# Patient Record
Sex: MALE | Race: WHITE | HISPANIC OR LATINO | Employment: OTHER | URBAN - METROPOLITAN AREA
[De-identification: names, ages, dates, MRNs, and addresses within clinical notes are randomized per-mention and may not be internally consistent; named-entity substitution may affect disease eponyms.]

---

## 2017-11-28 ENCOUNTER — HOSPITAL ENCOUNTER (OUTPATIENT)
Facility: HOSPITAL | Age: 27
Setting detail: OBSERVATION
Discharge: HOME | End: 2017-11-29
Attending: EMERGENCY MEDICINE | Admitting: UROLOGY

## 2017-11-28 ENCOUNTER — APPOINTMENT (EMERGENCY)
Dept: CT IMAGING | Facility: HOSPITAL | Age: 27
End: 2017-11-28

## 2017-11-28 ENCOUNTER — ANESTHESIA EVENT (OUTPATIENT)
Dept: PERIOP | Facility: HOSPITAL | Age: 27
End: 2017-11-28

## 2017-11-28 DIAGNOSIS — N20.0 KIDNEY STONE ON LEFT SIDE: Primary | ICD-10-CM

## 2017-11-28 DIAGNOSIS — N20.0 RENAL CALCULUS, LEFT: ICD-10-CM

## 2017-11-28 LAB
ALBUMIN SERPL BCP-MCNC: 4.1 G/DL (ref 3.5–5)
ALP SERPL-CCNC: 76 U/L (ref 46–116)
ALT SERPL W P-5'-P-CCNC: 26 U/L (ref 12–78)
ANION GAP SERPL CALCULATED.3IONS-SCNC: 10 MMOL/L (ref 4–13)
AST SERPL W P-5'-P-CCNC: 21 U/L (ref 5–45)
BACTERIA UR QL AUTO: ABNORMAL /HPF
BASOPHILS # BLD AUTO: 0.02 THOUSANDS/ΜL (ref 0–0.1)
BASOPHILS NFR BLD AUTO: 0 % (ref 0–1)
BILIRUB SERPL-MCNC: 0.58 MG/DL (ref 0.2–1)
BILIRUB UR QL STRIP: NEGATIVE
BUN SERPL-MCNC: 19 MG/DL (ref 5–25)
CALCIUM SERPL-MCNC: 8.9 MG/DL (ref 8.3–10.1)
CHLORIDE SERPL-SCNC: 104 MMOL/L (ref 100–108)
CLARITY UR: CLEAR
CO2 SERPL-SCNC: 24 MMOL/L (ref 21–32)
COLOR UR: YELLOW
CREAT SERPL-MCNC: 1.39 MG/DL (ref 0.6–1.3)
EOSINOPHIL # BLD AUTO: 0.16 THOUSAND/ΜL (ref 0–0.61)
EOSINOPHIL NFR BLD AUTO: 2 % (ref 0–6)
ERYTHROCYTE [DISTWIDTH] IN BLOOD BY AUTOMATED COUNT: 12.1 % (ref 11.6–15.1)
GFR SERPL CREATININE-BSD FRML MDRD: 68 ML/MIN/1.73SQ M
GLUCOSE SERPL-MCNC: 116 MG/DL (ref 65–140)
GLUCOSE UR STRIP-MCNC: NEGATIVE MG/DL
HCT VFR BLD AUTO: 42 % (ref 36.5–49.3)
HGB BLD-MCNC: 14.7 G/DL (ref 12–17)
HGB UR QL STRIP.AUTO: ABNORMAL
KETONES UR STRIP-MCNC: NEGATIVE MG/DL
LEUKOCYTE ESTERASE UR QL STRIP: NEGATIVE
LIPASE SERPL-CCNC: 137 U/L (ref 73–393)
LYMPHOCYTES # BLD AUTO: 2.42 THOUSANDS/ΜL (ref 0.6–4.47)
LYMPHOCYTES NFR BLD AUTO: 34 % (ref 14–44)
MCH RBC QN AUTO: 32.7 PG (ref 26.8–34.3)
MCHC RBC AUTO-ENTMCNC: 35 G/DL (ref 31.4–37.4)
MCV RBC AUTO: 93 FL (ref 82–98)
MONOCYTES # BLD AUTO: 0.93 THOUSAND/ΜL (ref 0.17–1.22)
MONOCYTES NFR BLD AUTO: 13 % (ref 4–12)
NEUTROPHILS # BLD AUTO: 3.69 THOUSANDS/ΜL (ref 1.85–7.62)
NEUTS SEG NFR BLD AUTO: 51 % (ref 43–75)
NITRITE UR QL STRIP: NEGATIVE
NON-SQ EPI CELLS URNS QL MICRO: ABNORMAL /HPF
NRBC BLD AUTO-RTO: 0 /100 WBCS
PH UR STRIP.AUTO: 7 [PH] (ref 4.5–8)
PLATELET # BLD AUTO: 241 THOUSANDS/UL (ref 149–390)
PMV BLD AUTO: 9.7 FL (ref 8.9–12.7)
POTASSIUM SERPL-SCNC: 4 MMOL/L (ref 3.5–5.3)
PROT SERPL-MCNC: 7.6 G/DL (ref 6.4–8.2)
PROT UR STRIP-MCNC: NEGATIVE MG/DL
RBC # BLD AUTO: 4.5 MILLION/UL (ref 3.88–5.62)
RBC #/AREA URNS AUTO: ABNORMAL /HPF
SODIUM SERPL-SCNC: 138 MMOL/L (ref 136–145)
SP GR UR STRIP.AUTO: 1.01 (ref 1–1.03)
UROBILINOGEN UR QL STRIP.AUTO: 0.2 E.U./DL
WBC # BLD AUTO: 7.22 THOUSAND/UL (ref 4.31–10.16)
WBC #/AREA URNS AUTO: ABNORMAL /HPF

## 2017-11-28 PROCEDURE — 83690 ASSAY OF LIPASE: CPT | Performed by: PHYSICIAN ASSISTANT

## 2017-11-28 PROCEDURE — 81001 URINALYSIS AUTO W/SCOPE: CPT

## 2017-11-28 PROCEDURE — 96374 THER/PROPH/DIAG INJ IV PUSH: CPT

## 2017-11-28 PROCEDURE — 85025 COMPLETE CBC W/AUTO DIFF WBC: CPT | Performed by: PHYSICIAN ASSISTANT

## 2017-11-28 PROCEDURE — 80053 COMPREHEN METABOLIC PANEL: CPT | Performed by: PHYSICIAN ASSISTANT

## 2017-11-28 PROCEDURE — 74176 CT ABD & PELVIS W/O CONTRAST: CPT

## 2017-11-28 PROCEDURE — 81002 URINALYSIS NONAUTO W/O SCOPE: CPT | Performed by: PHYSICIAN ASSISTANT

## 2017-11-28 PROCEDURE — 96375 TX/PRO/DX INJ NEW DRUG ADDON: CPT

## 2017-11-28 PROCEDURE — 99285 EMERGENCY DEPT VISIT HI MDM: CPT

## 2017-11-28 PROCEDURE — 36415 COLL VENOUS BLD VENIPUNCTURE: CPT | Performed by: PHYSICIAN ASSISTANT

## 2017-11-28 PROCEDURE — 96361 HYDRATE IV INFUSION ADD-ON: CPT

## 2017-11-28 RX ORDER — KETOROLAC TROMETHAMINE 30 MG/ML
15 INJECTION, SOLUTION INTRAMUSCULAR; INTRAVENOUS ONCE
Status: COMPLETED | OUTPATIENT
Start: 2017-11-28 | End: 2017-11-28

## 2017-11-28 RX ORDER — ONDANSETRON 2 MG/ML
4 INJECTION INTRAMUSCULAR; INTRAVENOUS ONCE
Status: COMPLETED | OUTPATIENT
Start: 2017-11-28 | End: 2017-11-28

## 2017-11-28 RX ORDER — ONDANSETRON 2 MG/ML
4 INJECTION INTRAMUSCULAR; INTRAVENOUS EVERY 6 HOURS PRN
Status: DISCONTINUED | OUTPATIENT
Start: 2017-11-28 | End: 2017-11-29 | Stop reason: HOSPADM

## 2017-11-28 RX ORDER — TAMSULOSIN HYDROCHLORIDE 0.4 MG/1
0.4 CAPSULE ORAL ONCE
Status: DISCONTINUED | OUTPATIENT
Start: 2017-11-28 | End: 2017-11-29 | Stop reason: HOSPADM

## 2017-11-28 RX ORDER — MORPHINE SULFATE 2 MG/ML
2 INJECTION, SOLUTION INTRAMUSCULAR; INTRAVENOUS ONCE
Status: COMPLETED | OUTPATIENT
Start: 2017-11-28 | End: 2017-11-28

## 2017-11-28 RX ORDER — TAMSULOSIN HYDROCHLORIDE 0.4 MG/1
0.4 CAPSULE ORAL ONCE
Status: COMPLETED | OUTPATIENT
Start: 2017-11-28 | End: 2017-11-28

## 2017-11-28 RX ORDER — SODIUM CHLORIDE 9 MG/ML
125 INJECTION, SOLUTION INTRAVENOUS CONTINUOUS
Status: DISCONTINUED | OUTPATIENT
Start: 2017-11-28 | End: 2017-11-29 | Stop reason: HOSPADM

## 2017-11-28 RX ADMIN — SODIUM CHLORIDE 125 ML/HR: 0.9 INJECTION, SOLUTION INTRAVENOUS at 21:57

## 2017-11-28 RX ADMIN — TAMSULOSIN HYDROCHLORIDE 0.4 MG: 0.4 CAPSULE ORAL at 10:00

## 2017-11-28 RX ADMIN — KETOROLAC TROMETHAMINE 15 MG: 30 INJECTION, SOLUTION INTRAMUSCULAR at 08:44

## 2017-11-28 RX ADMIN — MORPHINE SULFATE 2 MG: 2 INJECTION, SOLUTION INTRAMUSCULAR; INTRAVENOUS at 09:38

## 2017-11-28 RX ADMIN — KETOROLAC TROMETHAMINE 15 MG: 30 INJECTION, SOLUTION INTRAMUSCULAR at 13:24

## 2017-11-28 RX ADMIN — HYDROMORPHONE HYDROCHLORIDE 0.5 MG: 1 INJECTION, SOLUTION INTRAMUSCULAR; INTRAVENOUS; SUBCUTANEOUS at 10:18

## 2017-11-28 RX ADMIN — SODIUM CHLORIDE 1000 ML: 0.9 INJECTION, SOLUTION INTRAVENOUS at 09:09

## 2017-11-28 RX ADMIN — SODIUM CHLORIDE 1000 ML: 0.9 INJECTION, SOLUTION INTRAVENOUS at 12:00

## 2017-11-28 RX ADMIN — ONDANSETRON 4 MG: 2 INJECTION INTRAMUSCULAR; INTRAVENOUS at 09:09

## 2017-11-28 RX ADMIN — SODIUM CHLORIDE 125 ML/HR: 0.9 INJECTION, SOLUTION INTRAVENOUS at 13:24

## 2017-11-28 NOTE — CONSULTS
H&P- Urology   Anthony Griffin 32 y o  male MRN: [id number]  Unit/Bed#: ED 29 Encounter: [id number] 11/28/2017           Assessment/Plan  24 hours of severe left flank and lower quadrant pain  CT shows 6 x 3 millimeter stone with mild hydro  Will give the patient hydration, pain meds, Flomax  Observe overnight, and if no better tomorrow we will proceed with cysto, left ureteroscopy, stone laser and stent placement  We will reassess first thing in the morning before deciding if it is ago  Last episode the stone was 10 years ago             History of Present Illness     Attending: Shun Curtis MD    Reason for Consult /     Medications  Meds/Allergies     sodium chloride 125 mL/hr Last Rate: 125 mL/hr (11/28/17 1324)       HYDROmorphone    ondansetron  None       Current Facility-Administered Medications:     HYDROmorphone (DILAUDID) 1 mg/mL injection 0 5 mg, 0 5 mg, Intravenous, Q2H PRN, Lulla Jews, CRNP    nicotine (NICODERM CQ) 7 mg/24hr TD 24 hr patch 1 patch, 1 patch, Transdermal, Daily, Lulla Jews, CRNP    ondansetron [facility] injection 4 mg, 4 mg, Intravenous, Q6H PRN, Lulla Jews, CRNP    sodium chloride 0 9 % infusion, 125 mL/hr, Intravenous, Continuous, Lulla Jews, CRNP, Last Rate: 125 mL/hr at 11/28/17 1324, 125 mL/hr at 11/28/17 1324    tamsulosin (FLOMAX) capsule 0 4 mg, 0 4 mg, Oral, Once, Lulla Jews, CRNP, Stopped at 11/28/17 1317  No current outpatient prescriptions on file  Review of Systems  10 point review of systems negative except as noted in HPI  Allergies  No Known Allergies  PMH  Past Medical History:   Diagnosis Date    Kidney stones      Past surgical history  History reviewed  No pertinent surgical history    Social history  Social History   Substance Use Topics    Smoking status: Current Every Day Smoker    Smokeless tobacco: Never Used    Alcohol use No     ?  Physical Exam  General appearance: alert, appears stated age and cooperative  Head: Normocephalic, without obvious abnormality, atraumatic  Neck: no adenopathy, no carotid bruit, no JVD, supple, symmetrical, trachea midline and thyroid not enlarged, symmetric, no tenderness/mass/nodules  Lungs: clear to auscultation bilaterally  Heart: regular rate and rhythm, S1, S2 normal, no murmur, click, rub or gallop  Abdomen: soft, non-tender; bowel sounds normal; no masses,  no organomegaly  Male genitalia: normal  Extremities: extremities normal, atraumatic, no cyanosis or edema  Pulses: 2+ and symmetric  Lymph nodes: Cervical, supraclavicular, and axillary nodes normal   Neurologic: Grossly normal    Consults    Review of Systems    Historical Information   No Known Allergies  Past Medical History:   Diagnosis Date    Kidney stones      History reviewed  No pertinent surgical history  Social History   History   Alcohol Use No     History   Drug Use    Types: Marijuana     History   Smoking Status    Current Every Day Smoker   Smokeless Tobacco    Never Used     Family History: non-contributory        Lab Results:   CBC:   Lab Results   Component Value Date    WBC 7 22 11/28/2017    HGB 14 7 11/28/2017    HCT 42 0 11/28/2017    MCV 93 11/28/2017     11/28/2017    MCH 32 7 11/28/2017    MCHC 35 0 11/28/2017    RDW 12 1 11/28/2017    MPV 9 7 11/28/2017    NRBC 0 11/28/2017     CMP:   Lab Results   Component Value Date     11/28/2017     11/28/2017    CO2 24 11/28/2017    ANIONGAP 10 11/28/2017    BUN 19 11/28/2017    CREATININE 1 39 (H) 11/28/2017    GLUCOSE 116 11/28/2017    CALCIUM 8 9 11/28/2017    AST 21 11/28/2017    ALT 26 11/28/2017    ALKPHOS 76 11/28/2017    PROT 7 6 11/28/2017    ALBUMIN 4 1 11/28/2017    BILITOT 0 58 11/28/2017    EGFR 68 11/28/2017       Imaging Studies: I have personally reviewed pertinent reports      Ct Renal Stone Study Abdomen Pelvis Without Contrast    Result Date: 11/28/2017  Narrative: CT ABDOMEN AND PELVIS WITHOUT IV CONTRAST - LOW DOSE RENAL STONE INDICATION: Left flank pain radiating into left groin, difficulty urinating  COMPARISON: None TECHNIQUE:  Low dose thin section CT examination of the abdomen and pelvis was performed without intravenous or oral contrast according to a protocol specifically designed to evaluate for urinary tract calculus  Reformatted images were created in axial,  sagittal, and coronal planes  Evaluation for pathology in the abdomen and pelvis that is unrelated to urinary tract calculi is limited  Radiation dose length product (DLP) for this visit:  145 mGy-cm   This examination, like all CT scans performed in the [facility], was performed utilizing techniques to minimize radiation dose exposure, including the use of iterative reconstruction and automated exposure control  FINDINGS: RIGHT KIDNEY AND URETER: No urinary tract calculi  No hydronephrosis or hydroureter  No perinephric collection  LEFT KIDNEY AND URETER: Mild left hydroureteronephrosis secondary to a 6 x 3 mm calculus in the distal left ureter at or just above the level of the UVJ  URINARY BLADDER: Bladder suboptimally distended, otherwise unremarkable apart from the aforementioned calculus near the UVJ  Bilateral pelvic phleboliths  No significant abnormality in the visualized lung bases  Please note the upper liver, spleen, right adrenal gland and stomach were not fully included on axial images  Limited low radiation dose noncontrast CT evaluation demonstrates no clinically significant abnormality of liver, spleen, pancreas, or adrenal glands  No calcified gallstones or gallbladder wall thickening noted  No ascites  Evaluation of lymphadenopathy is limited in the absence of intravenous contrast  No bulky adenopathy detected on this noncontrast study  Bowel loops appear unremarkable  Limited evaluation demonstrates no evidence to suggest acute appendicitis  No acute fracture or destructive osseous lesion is identified  Impression: Mild left hydroureteronephrosis secondary to a 6 x 3 mm calculus in the distal left ureter at or just above the level of the UVJ  Workstation performed: [id number]       EKG, Pathology, and Other Studies: I have personally reviewed pertinent reports  Counseling / Coordination of Care  Total floor / unit time spent today 35 minutes  Greater than 50% of total time was spent with the patient and / or family counseling and / or coordination of care       Neymar Claros MD

## 2017-11-28 NOTE — PLAN OF CARE
Problem: PAIN - ADULT  Goal: Verbalizes/displays adequate comfort level or baseline comfort level  Interventions:  - Encourage patient to monitor pain and request assistance  - Assess pain using appropriate pain scale  - Administer analgesics based on type and severity of pain and evaluate response  - Implement non-pharmacological measures as appropriate and evaluate response  - Consider cultural and social influences on pain and pain management  - Notify physician/advanced practitioner if interventions unsuccessful or patient reports new pain  Outcome: Progressing      Problem: INFECTION - ADULT  Goal: Absence or prevention of progression during hospitalization  INTERVENTIONS:  - Assess and monitor for signs and symptoms of infection  - Monitor lab/diagnostic results  - Monitor all insertion sites, i e  indwelling lines, tubes, and drains  - Monitor endotracheal (as able) and nasal secretions for changes in amount and color  - [place] appropriate cooling/warming therapies per order  - Administer medications as ordered  - Instruct and encourage patient and family to use good hand hygiene technique  - Identify and instruct in appropriate isolation precautions for identified infection/condition  Outcome: Progressing    Goal: Absence of fever/infection during neutropenic period  INTERVENTIONS:  - Monitor WBC  - Implement neutropenic guidelines  Outcome: Progressing      Problem: SAFETY ADULT  Goal: Patient will remain free of falls  INTERVENTIONS:  - Assess patient frequently for physical needs  -  Identify cognitive and physical deficits and behaviors that affect risk of falls    -  [place] fall precautions as indicated by assessment   - Educate patient/family on patient safety including physical limitations  - Instruct patient to call for assistance with activity based on assessment  - Modify environment to reduce risk of injury  - Consider OT/PT consult to assist with strengthening/mobility  Outcome: Progressing    Goal: Maintain or return to baseline ADL function  INTERVENTIONS:  -  Assess patient's ability to carry out ADLs; assess patient's baseline for ADL function and identify physical deficits which impact ability to perform ADLs (bathing, care of mouth/teeth, toileting, grooming, dressing, etc )  - Assess/evaluate cause of self-care deficits   - Assess range of motion  - Assess patient's mobility; develop plan if impaired  - Assess patient's need for assistive devices and provide as appropriate  - Encourage maximum independence but intervene and supervise when necessary  ¯ Involve family in performance of ADLs  ¯ Assess for home care needs following discharge   ¯ Request OT consult to assist with ADL evaluation and planning for discharge  ¯ Provide patient education as appropriate  Outcome: Progressing    Goal: Maintain or return mobility status to optimal level  INTERVENTIONS:  - Assess patient's baseline mobility status (ambulation, transfers, stairs, etc )    - Identify cognitive and physical deficits and behaviors that affect mobility  - Identify mobility aids required to assist with transfers and/or ambulation (gait belt, sit-to-stand, lift, walker, cane, etc )  - [place] fall precautions as indicated by assessment  - Record patient progress and toleration of activity level on Mobility SBAR; progress patient to next Phase/Stage  - Instruct patient to call for assistance with activity based on assessment  - Request Rehabilitation consult to assist with strengthening/weightbearing, etc   Outcome: Progressing      Problem: DISCHARGE PLANNING  Goal: Discharge to home or other facility with appropriate resources  INTERVENTIONS:  - Identify barriers to discharge w/patient and caregiver  - Arrange for needed discharge resources and transportation as appropriate  - Identify discharge learning needs (meds, wound care, etc )  - Arrange for interpretive services to assist at discharge as needed  - Refer to Case Management Department for coordinating discharge planning if the patient needs post-hospital services based on physician/advanced practitioner order or complex needs related to functional status, cognitive ability, or social support system  Outcome: Progressing      Problem: Knowledge Deficit  Goal: Patient/family/caregiver demonstrates understanding of disease process, treatment plan, medications, and discharge instructions  Complete learning assessment and assess knowledge base    Interventions:  - Provide teaching at level of understanding  - Provide teaching via preferred learning methods  Outcome: Progressing

## 2017-11-28 NOTE — ED NOTES
Pt rang call bell reporting he urinated, approx 100mL urine noted in urinal  Urine strained no stones noted   Will need new sample for urine dip     Ligia Lawson RN  11/28/17 1499

## 2017-11-28 NOTE — ED NOTES
Patient writhing in pain, verbal order for 15mg of Toradol obtained from Dr Ramón Patino, RN  11/28/17 5340

## 2017-11-28 NOTE — ED PROVIDER NOTES
History  Chief Complaint   Patient presents with    Flank Pain     Left flank pain that radiates into left groin with some difficulty urinating  This is a 52-year-old male patient who woke up this morning at 0600 hours stated he had left flank pain that radiated into his left lower quadrant occasionally into his testicle  He describes it is sharp it also caused some nausea and vomiting  He was brought in here was given Toradol before into the room which greatly decreased his pain he is now comfortable  He states he has had a kidney stone the past and a single similar  No fever no chills no headache no blurred vision or double vision no cough congestion sore throat no chest pain or shortness of breath no urgency frequency or dysuria  Nothing made it worse the Toradol made it better  Differential diagnosis includes not limited to kidney stone, pyelonephritis, renal colic, testicular torsion        Flank Pain       None       Past Medical History:   Diagnosis Date    Kidney stones        History reviewed  No pertinent surgical history  History reviewed  No pertinent family history  I have reviewed and agree with the history as documented  Social History   Substance Use Topics    Smoking status: Current Every Day Smoker    Smokeless tobacco: Never Used    Alcohol use No        Review of Systems   Genitourinary: Positive for flank pain  All other systems reviewed and are negative        Physical Exam  ED Triage Vitals [11/28/17 0837]   Temperature Pulse Respirations Blood Pressure SpO2   98 4 °F (36 9 °C) 81 16 130/78 94 %      Temp Source Heart Rate Source Patient Position - Orthostatic VS BP Location FiO2 (%)   Oral Monitor Lying Right arm --      Pain Score       Worst Possible Pain           Orthostatic Vital Signs  Vitals:    11/28/17 0837 11/28/17 1025 11/28/17 1145   BP: 130/78 110/57 110/57   Pulse: 81 76 63   Patient Position - Orthostatic VS: Lying Lying Lying       Physical Exam Constitutional: He appears well-developed and well-nourished  HENT:   Head: Normocephalic and atraumatic  Right Ear: External ear normal    Left Ear: External ear normal    Nose: Nose normal    Mouth/Throat: Oropharynx is clear and moist    Eyes: Conjunctivae are normal  Pupils are equal, round, and reactive to light  Neck: Normal range of motion  Neck supple  Cardiovascular: Normal rate and regular rhythm  Pulmonary/Chest: Effort normal and breath sounds normal    Abdominal: Soft  Bowel sounds are normal  There is no tenderness  Positive left CVA tenderness   Genitourinary: Testes normal and penis normal  Cremasteric reflex is present  Circumcised  Neurological: He is alert  Skin: Skin is warm  Psychiatric: He has a normal mood and affect  His behavior is normal    Nursing note and vitals reviewed  ED Medications  Medications   ketorolac (TORADOL) 30 mg/mL injection 15 mg (15 mg Intravenous Given 11/28/17 0844)   sodium chloride 0 9 % bolus 1,000 mL (0 mL Intravenous Stopped 11/28/17 1006)   ondansetron (ZOFRAN) injection 4 mg (4 mg Intravenous Given 11/28/17 0909)   morphine injection 2 mg (2 mg Intravenous Given 11/28/17 0938)   tamsulosin (FLOMAX) capsule 0 4 mg (0 4 mg Oral Given 11/28/17 1000)   HYDROmorphone (DILAUDID) 1 mg/mL injection 0 5 mg (0 5 mg Intravenous Given 11/28/17 1018)       Diagnostic Studies  Results Reviewed     Procedure Component Value Units Date/Time    Urine Microscopic [[id number]] Collected:  11/28/17 1316    Lab Status:   In process Specimen:  Urine Updated:  11/28/17 1303    ED Urine Macroscopic [[id number]]  (Abnormal) Collected:  11/28/17 1316    Lab Status:  Final result Specimen:  Urine Updated:  11/28/17 1300     Color, UA Yellow     Clarity, UA Clear     pH, UA 7 0     Leukocytes, UA Negative     Nitrite, UA Negative     Protein, UA Negative mg/dl      Glucose, UA Negative mg/dl      Ketones, UA Negative mg/dl      Urobilinogen, UA 0 2 E U /dl Bilirubin, UA Negative     Blood, UA Moderate (A)     Specific Council, UA 1 015    Narrative:       CLINITEK RESULT    Comprehensive metabolic panel [[id number]]  (Abnormal) Collected:  11/28/17 0908    Lab Status:  Final result Specimen:  Blood from Arm, Right Updated:  11/28/17 0940     Sodium 138 mmol/L      Potassium 4 0 mmol/L      Chloride 104 mmol/L      CO2 24 mmol/L      Anion Gap 10 mmol/L      BUN 19 mg/dL      Creatinine 1 39 (H) mg/dL      Glucose 116 mg/dL      Calcium 8 9 mg/dL      AST 21 U/L      ALT 26 U/L      Alkaline Phosphatase 76 U/L      Total Protein 7 6 g/dL      Albumin 4 1 g/dL      Total Bilirubin 0 58 mg/dL      eGFR 68 ml/min/1 73sq m     Narrative:         National Kidney Disease Education Program recommendations are as follows:  GFR calculation is accurate only with a steady state creatinine  Chronic Kidney disease less than 60 ml/min/1 73 sq  meters  Kidney failure less than 15 ml/min/1 73 sq  meters      Lipase [[id number]]  (Normal) Collected:  11/28/17 0908    Lab Status:  Final result Specimen:  Blood from Arm, Right Updated:  11/28/17 0940     Lipase 137 u/L     CBC and differential [[id number]]  (Abnormal) Collected:  11/28/17 0908    Lab Status:  Final result Specimen:  Blood from Arm, Right Updated:  11/28/17 0926     WBC 7 22 Thousand/uL      RBC 4 50 Million/uL      Hemoglobin 14 7 g/dL      Hematocrit 42 0 %      MCV 93 fL      MCH 32 7 pg      MCHC 35 0 g/dL      RDW 12 1 %      MPV 9 7 fL      Platelets 651 Thousands/uL      nRBC 0 /100 WBCs      Neutrophils Relative 51 %      Lymphocytes Relative 34 %      Monocytes Relative 13 (H) %      Eosinophils Relative 2 %      Basophils Relative 0 %      Neutrophils Absolute 3 69 Thousands/µL      Lymphocytes Absolute 2 42 Thousands/µL      Monocytes Absolute 0 93 Thousand/µL      Eosinophils Absolute 0 16 Thousand/µL      Basophils Absolute 0 02 Thousands/µL     POCT urinalysis dipstick [[id number]]     Lab Status:  No result Specimen:  Urine                  CT renal stone study abdomen pelvis without contrast   Final Result by Alejandra Traylor DO (11/28 3243)      Mild left hydroureteronephrosis secondary to a 6 x 3 mm calculus in the distal left ureter at or just above the level of the UVJ  Workstation performed: [id number]                    Procedures  Procedures       Phone Contacts  ED Phone Contact    ED Course  ED Course                                MDM  Number of Diagnoses or Management Options  Diagnosis management comments: This 61-year-old male patient who started with left flank pain insecure out this morning he was found have a 6 x 3 millimeter stone that is obstructing I attempted Toradol, morphine, Dilaudid and fluids unfortunately the patient cannot pass this stone any is still an 8 on 10 pain  I spoke with Urology advanced practitioner who states he will admit the patient for Dr Sam Ogden for stone intervention  CritCare Time    Disposition  Final diagnoses:   Kidney stone on left side     Time reflects when diagnosis was documented in both MDM as applicable and the Disposition within this note     Time User Action Codes Description Comment    11/28/2017  1:04 PM Marie Rutherford Add [N20 0] Kidney stone on left side       ED Disposition     ED Disposition Condition Comment    Admit  Case was discussed with [facility] for urology (dr Abdelrahman Torres) and the patient's admission status was agreed to be Admission Status: observation status to the service of Dr Abdelrahman Torres   Follow-up Information    None       Patient's Medications    No medications on file     No discharge procedures on file      ED Provider  Electronically Signed by           Kasey Blum PA-C  11/28/17 0874

## 2017-11-28 NOTE — ANESTHESIA PREPROCEDURE EVALUATION
Review of Systems/Medical History  Patient summary reviewed  Chart reviewed      Cardiovascular   Pulmonary  Smoker cigarette smoker , ,        GI/Hepatic  Negative GI/hepatic ROS          Kidney stones,        Endo/Other  Negative endo/other ROS      GYN       Hematology  Negative hematology ROS      Musculoskeletal  Negative musculoskeletal ROS        Neurology  Negative neurology ROS      Psychology           Physical Exam    Airway    Mallampati score: I  TM Distance: <3 FB  Neck ROM: full     Dental   No notable dental hx     Cardiovascular  Rhythm: regular, Rate: normal, Cardiovascular exam normal    Pulmonary  Pulmonary exam normal     Other Findings        Anesthesia Plan  ASA Score- 2       Anesthesia Type- general with ASA Monitors  Additional Monitors:   Airway Plan:           Induction- intravenous  Informed Consent- Anesthetic plan and risks discussed with patient

## 2017-11-29 ENCOUNTER — APPOINTMENT (OUTPATIENT)
Dept: RADIOLOGY | Facility: HOSPITAL | Age: 27
End: 2017-11-29

## 2017-11-29 ENCOUNTER — ANESTHESIA (OUTPATIENT)
Dept: PERIOP | Facility: HOSPITAL | Age: 27
End: 2017-11-29

## 2017-11-29 VITALS
RESPIRATION RATE: 16 BRPM | SYSTOLIC BLOOD PRESSURE: 123 MMHG | HEART RATE: 56 BPM | TEMPERATURE: 96.7 F | WEIGHT: 140 LBS | OXYGEN SATURATION: 97 % | DIASTOLIC BLOOD PRESSURE: 70 MMHG

## 2017-11-29 LAB
ANION GAP SERPL CALCULATED.3IONS-SCNC: 6 MMOL/L (ref 4–13)
BUN SERPL-MCNC: 13 MG/DL (ref 5–25)
CALCIUM SERPL-MCNC: 8.3 MG/DL (ref 8.3–10.1)
CHLORIDE SERPL-SCNC: 109 MMOL/L (ref 100–108)
CO2 SERPL-SCNC: 27 MMOL/L (ref 21–32)
CREAT SERPL-MCNC: 1.11 MG/DL (ref 0.6–1.3)
ERYTHROCYTE [DISTWIDTH] IN BLOOD BY AUTOMATED COUNT: 12.2 % (ref 11.6–15.1)
GFR SERPL CREATININE-BSD FRML MDRD: 89 ML/MIN/1.73SQ M
GLUCOSE SERPL-MCNC: 99 MG/DL (ref 65–140)
HCT VFR BLD AUTO: 38.7 % (ref 36.5–49.3)
HGB BLD-MCNC: 13 G/DL (ref 12–17)
MCH RBC QN AUTO: 31.6 PG (ref 26.8–34.3)
MCHC RBC AUTO-ENTMCNC: 33.6 G/DL (ref 31.4–37.4)
MCV RBC AUTO: 94 FL (ref 82–98)
PLATELET # BLD AUTO: 191 THOUSANDS/UL (ref 149–390)
PMV BLD AUTO: 9.7 FL (ref 8.9–12.7)
POTASSIUM SERPL-SCNC: 4 MMOL/L (ref 3.5–5.3)
RBC # BLD AUTO: 4.11 MILLION/UL (ref 3.88–5.62)
SODIUM SERPL-SCNC: 142 MMOL/L (ref 136–145)
WBC # BLD AUTO: 8.62 THOUSAND/UL (ref 4.31–10.16)

## 2017-11-29 PROCEDURE — 80048 BASIC METABOLIC PNL TOTAL CA: CPT | Performed by: NURSE PRACTITIONER

## 2017-11-29 PROCEDURE — C1769 GUIDE WIRE: HCPCS | Performed by: UROLOGY

## 2017-11-29 PROCEDURE — 85027 COMPLETE CBC AUTOMATED: CPT | Performed by: NURSE PRACTITIONER

## 2017-11-29 PROCEDURE — 74450 X-RAY URETHRA/BLADDER: CPT

## 2017-11-29 PROCEDURE — C2617 STENT, NON-COR, TEM W/O DEL: HCPCS | Performed by: UROLOGY

## 2017-11-29 DEVICE — STENT URETERAL 6 FR 26CM INLAY OPTIMA: Type: IMPLANTABLE DEVICE | Site: URETER | Status: FUNCTIONAL

## 2017-11-29 RX ORDER — PHENAZOPYRIDINE HYDROCHLORIDE 200 MG/1
200 TABLET, FILM COATED ORAL
Qty: 10 TABLET | Refills: 0 | Status: SHIPPED | OUTPATIENT
Start: 2017-11-29 | End: 2018-11-04

## 2017-11-29 RX ORDER — MAGNESIUM HYDROXIDE 1200 MG/15ML
LIQUID ORAL AS NEEDED
Status: DISCONTINUED | OUTPATIENT
Start: 2017-11-29 | End: 2017-11-29 | Stop reason: HOSPADM

## 2017-11-29 RX ORDER — TAMSULOSIN HYDROCHLORIDE 0.4 MG/1
0.4 CAPSULE ORAL ONCE
Qty: 1 CAPSULE | Refills: 0 | Status: SHIPPED | OUTPATIENT
Start: 2017-11-29 | End: 2018-11-04

## 2017-11-29 RX ORDER — HYDROCODONE BITARTRATE AND ACETAMINOPHEN 5; 325 MG/1; MG/1
1 TABLET ORAL EVERY 6 HOURS PRN
Status: DISCONTINUED | OUTPATIENT
Start: 2017-11-29 | End: 2017-11-29 | Stop reason: HOSPADM

## 2017-11-29 RX ORDER — CEPHALEXIN 500 MG/1
500 CAPSULE ORAL EVERY 6 HOURS SCHEDULED
Qty: 40 CAPSULE | Refills: 0 | Status: SHIPPED | OUTPATIENT
Start: 2017-11-29 | End: 2017-12-11

## 2017-11-29 RX ORDER — HYDROCODONE BITARTRATE AND ACETAMINOPHEN 5; 325 MG/1; MG/1
1 TABLET ORAL EVERY 4 HOURS PRN
Qty: 30 TABLET | Refills: 0 | Status: SHIPPED | OUTPATIENT
Start: 2017-11-29 | End: 2018-11-04

## 2017-11-29 RX ORDER — ONDANSETRON 4 MG/1
4 TABLET, FILM COATED ORAL EVERY 8 HOURS PRN
Qty: 20 TABLET | Refills: 0 | Status: SHIPPED | OUTPATIENT
Start: 2017-11-29 | End: 2018-11-04

## 2017-11-29 RX ORDER — ONDANSETRON 2 MG/ML
INJECTION INTRAMUSCULAR; INTRAVENOUS AS NEEDED
Status: DISCONTINUED | OUTPATIENT
Start: 2017-11-29 | End: 2017-11-29 | Stop reason: SURG

## 2017-11-29 RX ORDER — FENTANYL CITRATE 50 UG/ML
INJECTION, SOLUTION INTRAMUSCULAR; INTRAVENOUS AS NEEDED
Status: DISCONTINUED | OUTPATIENT
Start: 2017-11-29 | End: 2017-11-29 | Stop reason: SURG

## 2017-11-29 RX ORDER — ONDANSETRON 2 MG/ML
4 INJECTION INTRAMUSCULAR; INTRAVENOUS EVERY 6 HOURS PRN
Status: DISCONTINUED | OUTPATIENT
Start: 2017-11-29 | End: 2017-11-29 | Stop reason: HOSPADM

## 2017-11-29 RX ORDER — PROPOFOL 10 MG/ML
INJECTION, EMULSION INTRAVENOUS AS NEEDED
Status: DISCONTINUED | OUTPATIENT
Start: 2017-11-29 | End: 2017-11-29 | Stop reason: SURG

## 2017-11-29 RX ORDER — KETOROLAC TROMETHAMINE 30 MG/ML
INJECTION, SOLUTION INTRAMUSCULAR; INTRAVENOUS AS NEEDED
Status: DISCONTINUED | OUTPATIENT
Start: 2017-11-29 | End: 2017-11-29 | Stop reason: SURG

## 2017-11-29 RX ORDER — FENTANYL CITRATE 50 UG/ML
25 INJECTION, SOLUTION INTRAMUSCULAR; INTRAVENOUS
Status: DISCONTINUED | OUTPATIENT
Start: 2017-11-29 | End: 2017-11-29 | Stop reason: HOSPADM

## 2017-11-29 RX ORDER — MIDAZOLAM HYDROCHLORIDE 1 MG/ML
INJECTION INTRAMUSCULAR; INTRAVENOUS AS NEEDED
Status: DISCONTINUED | OUTPATIENT
Start: 2017-11-29 | End: 2017-11-29 | Stop reason: SURG

## 2017-11-29 RX ORDER — PHENAZOPYRIDINE HYDROCHLORIDE 200 MG/1
200 TABLET, FILM COATED ORAL
Status: DISCONTINUED | OUTPATIENT
Start: 2017-11-29 | End: 2017-11-29 | Stop reason: HOSPADM

## 2017-11-29 RX ORDER — DEXAMETHASONE SODIUM PHOSPHATE 4 MG/ML
INJECTION, SOLUTION INTRA-ARTICULAR; INTRALESIONAL; INTRAMUSCULAR; INTRAVENOUS; SOFT TISSUE AS NEEDED
Status: DISCONTINUED | OUTPATIENT
Start: 2017-11-29 | End: 2017-11-29 | Stop reason: SURG

## 2017-11-29 RX ADMIN — MIDAZOLAM HYDROCHLORIDE 2 MG: 1 INJECTION, SOLUTION INTRAMUSCULAR; INTRAVENOUS at 11:36

## 2017-11-29 RX ADMIN — LIDOCAINE HYDROCHLORIDE 80 MG: 20 INJECTION, SOLUTION INTRAVENOUS at 11:41

## 2017-11-29 RX ADMIN — ONDANSETRON HYDROCHLORIDE 4 MG: 2 INJECTION, SOLUTION INTRAVENOUS at 11:36

## 2017-11-29 RX ADMIN — FENTANYL CITRATE 25 MCG: 50 INJECTION INTRAMUSCULAR; INTRAVENOUS at 12:05

## 2017-11-29 RX ADMIN — KETOROLAC TROMETHAMINE 30 MG: 30 INJECTION, SOLUTION INTRAMUSCULAR at 12:27

## 2017-11-29 RX ADMIN — FENTANYL CITRATE 25 MCG: 50 INJECTION INTRAMUSCULAR; INTRAVENOUS at 13:22

## 2017-11-29 RX ADMIN — PROPOFOL 200 MG: 10 INJECTION, EMULSION INTRAVENOUS at 11:41

## 2017-11-29 RX ADMIN — FENTANYL CITRATE 25 MCG: 50 INJECTION INTRAMUSCULAR; INTRAVENOUS at 11:47

## 2017-11-29 RX ADMIN — SODIUM CHLORIDE 125 ML/HR: 0.9 INJECTION, SOLUTION INTRAVENOUS at 11:28

## 2017-11-29 RX ADMIN — FENTANYL CITRATE 25 MCG: 50 INJECTION INTRAMUSCULAR; INTRAVENOUS at 13:10

## 2017-11-29 RX ADMIN — SODIUM CHLORIDE 125 ML/HR: 0.9 INJECTION, SOLUTION INTRAVENOUS at 05:54

## 2017-11-29 RX ADMIN — DEXAMETHASONE SODIUM PHOSPHATE 4 MG: 4 INJECTION, SOLUTION INTRAMUSCULAR; INTRAVENOUS at 11:48

## 2017-11-29 RX ADMIN — SODIUM CHLORIDE: 0.9 INJECTION, SOLUTION INTRAVENOUS at 12:22

## 2017-11-29 RX ADMIN — FENTANYL CITRATE 50 MCG: 50 INJECTION INTRAMUSCULAR; INTRAVENOUS at 12:09

## 2017-11-29 RX ADMIN — CEFAZOLIN SODIUM 2000 MG: 2 SOLUTION INTRAVENOUS at 11:44

## 2017-11-29 NOTE — ANESTHESIA POSTPROCEDURE EVALUATION
Post-Op Assessment Note      CV Status:  Stable    Mental Status:  Alert and awake    Hydration Status:  Euvolemic    PONV Controlled:  Controlled    Airway Patency:  Patent    Post Op Vitals Reviewed: Yes          Staff: Anesthesiologist           /70 (11/29/17 1353)    Temp (!) 96 7 °F (35 9 °C) (11/29/17 1353)    Pulse 56 (11/29/17 1353)   Resp 16 (11/29/17 1353)    SpO2 97 % (11/29/17 1353)

## 2017-11-29 NOTE — PROGRESS NOTES
Progress Note - Urology Progress  Yesenia Pham 32 y o  male MRN: [id number]  Unit/Bed#: [place] 68 2 -01 Encounter: [id number]    Assessment:  Minimal pain overnight, but stone not seen, so very unlikely has passed  Plan:  Dr Kayla Alvares scheduled to do ureteroscopy, laser, stent today  Blood pressure 109/54, pulse 58, temperature (!) 96 8 °F (36 °C), temperature source Tympanic, resp  rate 18, weight 63 5 kg (140 lb), SpO2 98 %  ,There is no height or weight on file to calculate BMI        Intake/Output Summary (Last 24 hours) at 11/29/17 1036  Last data filed at 11/29/17 0552   Gross per 24 hour   Intake          2989 58 ml   Output             2675 ml   Net           314 58 ml       Invasive Devices     Peripheral Intravenous Line            Peripheral IV 11/28/17 Right Antecubital 1 day                Physical Exam: General appearance: alert, appears stated age and cooperative  Abdomen: soft, non-tender; bowel sounds normal; no masses,  no organomegaly    Lab, Imaging and other studies:  CBC:   Lab Results   Component Value Date    WBC 8 62 11/29/2017    HGB 13 0 11/29/2017    HCT 38 7 11/29/2017    MCV 94 11/29/2017     11/29/2017    MCH 31 6 11/29/2017    MCHC 33 6 11/29/2017    RDW 12 2 11/29/2017    MPV 9 7 11/29/2017   , CMP:   Lab Results   Component Value Date     11/29/2017    K 4 0 11/29/2017     (H) 11/29/2017    CO2 27 11/29/2017    ANIONGAP 6 11/29/2017    BUN 13 11/29/2017    CREATININE 1 11 11/29/2017    GLUCOSE 99 11/29/2017    CALCIUM 8 3 11/29/2017    EGFR 89 11/29/2017     }

## 2017-11-29 NOTE — NURSING NOTE
IV removed  Pt given D/C instructions and scripts  All questions answered  Pt ambulated off unit with family

## 2017-11-29 NOTE — CASE MANAGEMENT
[facility] in the [facility] by Paulino Tejeda for 2017  Network Utilization Review Department  Phone: [phone]; Fax [phone]  ATTENTION: The Network Utilization Review Department is now centralized for our 7 Facilities  Make a note that we have a new phone and fax numbers for our Department  Please call with any questions or concerns to [phone] and carefully follow the prompts so that you are directed to the right person  All voicemails are confidential  Fax any determinations, approvals, denials, and requests for initial or continue stay review clinical to [phone]  Due to HIGH CALL volume, it would be easier if you could please send faxed requests to expedite your requests and in part, help us provide discharge notifications faster  Initial Clinical Review    Admission: Date/Time/Statement:[email or web address]    Orders Placed This Encounter   Procedures    Place in Observation (expected length of stay for this patient is less than two midnights)     Standing Status:   Standing     Number of Occurrences:   1     Order Specific Question:   Admitting Physician     Answer:   Gina Ames [[id number]]     Order Specific Question:   Level of Care     Answer:   Med Surg [16]    Place in Observation     Standing Status:   Standing     Number of Occurrences:   1     Order Specific Question:   Admitting Physician     Answer:   Gina Ames [[id number]]     Order Specific Question:   Level of Care     Answer:   Med Surg [16]         ED: Date/Time/Mode of Arrival:   ED Arrival Information     Expected Arrival Acuity Means of Arrival Escorted By Service Admission Type    - 11/28/2017 08:32 Urgent Ambulance [facility] Urgent    Arrival Complaint    groin pain          Chief Complaint:   Chief Complaint   Patient presents with    Flank Pain     Left flank pain that radiates into left groin with some difficulty urinating          History of Illness:     ED Vital Signs:   ED Triage Vitals [11/28/17 0837]   Temperature Pulse Respirations Blood Pressure SpO2   98 4 °F (36 9 °C) 81 16 130/78 94 %      Temp Source Heart Rate Source Patient Position - Orthostatic VS BP Location FiO2 (%)   Oral Monitor Lying Right arm --      Pain Score       Worst Possible Pain        Wt Readings from Last 1 Encounters:   11/28/17 63 5 kg (140 lb)       Vital Signs (abnormal): Abnormal Labs/Diagnostic Test Results:   WBC 7 22 11/28/2017      HGB 14 7 11/28/2017     HCT 42 0 11/28/2017     MCV 93 11/28/2017      11/28/2017     MCH 32 7 11/28/2017     MCHC 35 0 11/28/2017     RDW 12 1 11/28/2017     MPV 9 7 11/28/2017     NRBC 0 11/28/2017     CMP:   Lab Results   Component Value Date      11/28/2017      11/28/2017     CO2 24 11/28/2017     ANIONGAP 10 11/28/2017     BUN 19 11/28/2017     CREATININE 1 39 (H) 11/28/2017     GLUCOSE 116 11/28/2017     CALCIUM 8 9 11/28/2017     AST 21 11/28/2017     ALT 26 11/28/2017     ALKPHOS 76 11/28/2017     PROT 7 6 11/28/2017     ALBUMIN 4 1 11/28/2017     BILITOT 0 58 11/28/2017     EGFR 68 11/28/2017         Imaging Studies: I have personally reviewed pertinent reports  Ct Renal Stone Study Abdomen Pelvis Without Contrast     Result Date: 11/28/2017  Narrative: CT ABDOMEN AND PELVIS WITHOUT IV CONTRAST - LOW DOSE RENAL STONE INDICATION: Left flank pain radiating into left groin, difficulty urinating  COMPARISON: None TECHNIQUE:  Low dose thin section CT examination of the abdomen and pelvis was performed without intravenous or oral contrast according to a protocol specifically designed to evaluate for urinary tract calculus  Reformatted images were created in axial,  sagittal, and coronal planes  Evaluation for pathology in the abdomen and pelvis that is unrelated to urinary tract calculi is limited  Radiation dose length product (DLP) for this visit:  145 mGy-cm     This examination, like all CT scans performed in the [address] [place] [address]  [address] [place] [address] [place], was performed utilizing techniques to minimize radiation dose exposure, including the use of iterative reconstruction and automated exposure control  FINDINGS: RIGHT KIDNEY AND URETER: No urinary tract calculi  No hydronephrosis or hydroureter  No perinephric collection  LEFT KIDNEY AND URETER: Mild left hydroureteronephrosis secondary to a 6 x 3 mm calculus in the distal left ureter at or just above the level of the UVJ  URINARY BLADDER: Bladder suboptimally distended, otherwise unremarkable apart from the aforementioned calculus near the UVJ  Bilateral pelvic phleboliths  No significant abnormality in the visualized lung bases  Please note the upper liver, spleen, right adrenal gland and stomach were not fully included on axial images  Limited low radiation dose noncontrast CT evaluation demonstrates no clinically significant abnormality of liver, spleen, pancreas, or adrenal glands  No calcified gallstones or gallbladder wall thickening noted  No ascites  Evaluation of lymphadenopathy is limited in the absence of intravenous contrast  No bulky adenopathy detected on this noncontrast study  Bowel loops appear unremarkable  Limited evaluation demonstrates no evidence to suggest acute appendicitis  No acute fracture or destructive osseous lesion is identified       Impression: Mild left hydroureteronephrosis secondary to a 6 x 3 mm calculus in the distal left ureter at or just above the level of the UVJ   Workstation performed: [id number]           ED Treatment:   Medication Administration from 11/28/2017 0832 to 11/28/2017 2108       Date/Time Order Dose Route Action Action by Comments     11/28/2017 0832 ketorolac (TORADOL) 30 mg/mL injection 15 mg 15 mg Intravenous Given Meena Fan RN      11/28/2017 1006 sodium chloride 0 9 % bolus 1,000 mL 0 mL Intravenous Stopped Padmaja Singh RN      11/28/2017 0909 sodium chloride 0 9 % bolus 1,000 mL 1,000 mL Intravenous New Bag Kwabena Hercules ALDO Santos      11/28/2017 0909 ondansetron (ZOFRAN) injection 4 mg 4 mg Intravenous Given Emmy Arias RN      11/28/2017 8369 morphine injection 2 mg 2 mg Intravenous Given Emmy Arias RN      11/28/2017 1000 tamsulosin (FLOMAX) capsule 0 4 mg 0 4 mg Oral Given Mayte Leiva RN      11/28/2017 1018 HYDROmorphone (DILAUDID) 1 mg/mL injection 0 5 mg 0 5 mg Intravenous Given Mayte Leiva RN      11/28/2017 1324 sodium chloride 0 9 % infusion 125 mL/hr Intravenous ALDO Marie      11/28/2017 1430 nicotine (NICODERM CQ) 7 mg/24hr TD 24 hr patch 1 patch 1 patch Transdermal Not Given Leann Lugo RN      11/28/2017 1317 tamsulosin (FLOMAX) capsule 0 4 mg 0 mg Oral Hold Lalitha Santos RN DOSE GIVEN IN ED     11/28/2017 1324 ketorolac (TORADOL) 30 mg/mL injection 15 mg 15 mg Intravenous Given Emmy Arias RN      11/28/2017 1324 sodium chloride 0 9 % bolus 1,000 mL 0 mL Intravenous Stopped Emmy Arias RN      11/28/2017 1200 sodium chloride 0 9 % bolus 1,000 mL 1,000 mL Intravenous New Bag Emmy Arias RN           Past Medical/Surgical History: Active Ambulatory Problems     Diagnosis Date Noted    No Active Ambulatory Problems     Resolved Ambulatory Problems     Diagnosis Date Noted    No Resolved Ambulatory Problems     Past Medical History:   Diagnosis Date    Kidney stones        Admitting Diagnosis: Groin pain [R10 30]  Kidney stone on left side [N20 0]    Age/Sex: 32 y o  male    Assessment/Plan:   24 hours of severe left flank and lower quadrant pain  CT shows 6 x 3 millimeter stone with mild hydro      Will give the patient hydration, pain meds, Flomax    Observe overnight, and if no better tomorrow we will proceed with cysto, left ureteroscopy, stone laser and stent placement      We will reassess first thing in the morning before deciding if it is ago      Last episode the stone was 10 years ago        Admission Orders:  OBS [email or web address]  OOB  NPO  ACTIVITY AS RENE  Or: CYSTOSCOPY URETEROSCOPY WITH LITHOTRIPSY AND STENT AND PYELOGRAM  REG DIET    Scheduled Meds:   nicotine 1 patch Transdermal Daily   tamsulosin 0 4 mg Oral Once     Continuous Infusions:   sodium chloride 125 mL/hr Last Rate: 125 mL/hr (17 0554)     PRN Meds:   HYDROmorphone    ondansetron   Case ID: [id number] Case Time: 2017 11:53 AM Surgeon: Elder Saeed MD   Procedure: CYSTOSCOPY URETEROSCOPY WITH LITHOTRIPSY HOLMIUM LASER, RETROGRADE PYELOGRAM AND INSERTION STENT URETERAL Location: [facility] OR          []Hide copied text  []Hover for attribution information  OPERATIVE REPORT  PATIENT NAME: Elver Rowell    :  1990  MRN: [id number]  Pt Location: [facility] OR ROOM 07     SURGERY DATE: 2017     Surgeon(s) and Role:     * Elder Saeed MD - Primary     Preop Diagnosis:  Left ureteral calculus      Post-Op Diagnosis Codes:    Left ureteral calculus     Procedure(s) (LRB):  CYSTOSCOPY URETEROSCOPY WITH LITHOTRIPSY HOLMIUM LASER, RETROGRADE PYELOGRAM AND INSERTION STENT URETERAL (Left)     Specimen(s):  None     Estimated Blood Loss:   Minimal     Drains:  6 Croatian 26 cm double-J stent with string externalized to the penis        Anesthesia Type:   General     Operative Indications:  Left ureteral calculus     Operative Findings:  Two impacted left ureteral calculi distal     Complications:   None     Procedure and Technique:  Patient is a 25-year-old man with a distal left ureteral calculus seen on CT scan  He has no other calculi  He has a previous history of renal calculi, but the last episode was 10 years ago  He was hospitalized with left flank pain and mild hydronephrosis  I have offered him cystoscopy, left ureteroscopy, left retrograde pyelogram and left laser lithotripsy and left ureteral stent placement for this condition    Risks of bleeding infection and damage to the urinary tract with possible destruction of the ureter were explained and he gives informed consent      Patient was brought to the operating room and identified properly  LMA was induced the patient was prepped and draped in the dorsal lithotomy position in the usual fashion  A time-out was performed  Cystoscopy was carried out with a 22 Spanish cystoscopy sheath and 30 degree lens  Urethra and prostate were normal without stricture  The bladder was smooth not trabeculated there are no stones tumors or other lesions  The orifices were orthotopic and intact  Left retrograde pyelography was carried out with a open ended ureteral catheter and 50% Conray  The Conray was injected and I honestly could not see any and the ureter seemed to drain quite nicely  However I then placed a guidewire and this would not go past the area where on the CT scan the stones were seen  Eventually I was able to wiggle it passed what I thought were stones obstructing and then I placed the rigid ureteral scope into the distal left ureter and actually visualized 2 impacted stones that were fairly large  Placed another wire to get me to the stones safely without causing disruption of the ureter and using the 272 holmium laser fiber, I broke them up into tiny fragments  After this he was stone free  The small stone fragments were evacuated with the scope  A 6 Spanish stent was placed over the wire and coils were established in the renal pelvis and in the bladder  The bladder was drained the stent string was taped to the top of the penis     I was present for the entire procedure and A qualified resident physician was not available     Patient Disposition:  PACU  and extubated and stable                 D/C [email or web address]

## 2017-11-29 NOTE — OP NOTE
OPERATIVE REPORT  PATIENT NAME: Michael Cole    :  1990  MRN: [id number]  Pt Location: [facility] ROOM 07    SURGERY DATE: 2017    Surgeon(s) and Role:     * Enio Lira MD - Primary    Preop Diagnosis:  Left ureteral calculus     Post-Op Diagnosis Codes:    Left ureteral calculus    Procedure(s) (LRB):  CYSTOSCOPY URETEROSCOPY WITH LITHOTRIPSY HOLMIUM LASER, RETROGRADE PYELOGRAM AND INSERTION STENT URETERAL (Left)    Specimen(s):  None    Estimated Blood Loss:   Minimal    Drains:  6 Lithuanian 26 cm double-J stent with string externalized to the penis       Anesthesia Type:   General    Operative Indications:  Left ureteral calculus    Operative Findings:  Two impacted left ureteral calculi distal    Complications:   None    Procedure and Technique:  Patient is a 40-year-old man with a distal left ureteral calculus seen on CT scan  He has no other calculi  He has a previous history of renal calculi, but the last episode was 10 years ago  He was hospitalized with left flank pain and mild hydronephrosis  I have offered him cystoscopy, left ureteroscopy, left retrograde pyelogram and left laser lithotripsy and left ureteral stent placement for this condition  Risks of bleeding infection and damage to the urinary tract with possible destruction of the ureter were explained and he gives informed consent  Patient was brought to the operating room and identified properly  LMA was induced the patient was prepped and draped in the dorsal lithotomy position in the usual fashion  A time-out was performed  Cystoscopy was carried out with a 22 Lithuanian cystoscopy sheath and 30 degree lens  Urethra and prostate were normal without stricture  The bladder was smooth not trabeculated there are no stones tumors or other lesions  The orifices were orthotopic and intact  Left retrograde pyelography was carried out with a open ended ureteral catheter and 50% Conray    The Conray was injected and I honestly could not see any and the ureter seemed to drain quite nicely  However I then placed a guidewire and this would not go past the area where on the CT scan the stones were seen  Eventually I was able to wiggle it passed what I thought were stones obstructing and then I placed the rigid ureteral scope into the distal left ureter and actually visualized 2 impacted stones that were fairly large  Placed another wire to get me to the stones safely without causing disruption of the ureter and using the 272 holmium laser fiber, I broke them up into tiny fragments  After this he was stone free  The small stone fragments were evacuated with the scope  A 6 Croatian stent was placed over the wire and coils were established in the renal pelvis and in the bladder  The bladder was drained the stent string was taped to the top of the penis     I was present for the entire procedure and A qualified resident physician was not available    Patient Disposition:  PACU  and extubated and stable    SIGNATURE: Hermila Garcia MD  DATE: November 29, 2017  TIME: 12:35 PM

## 2017-12-06 ENCOUNTER — ALLSCRIPTS OFFICE VISIT (OUTPATIENT)
Dept: OTHER | Facility: OTHER | Age: 27
End: 2017-12-06

## 2017-12-11 NOTE — H&P
Denise Bethea MD Physician Signed Urology  Consults Date of Service: 11/28/2017  2:17 PM      Expand All Collapse All    []Hide copied text  []Hover for attribution information  H&P - Urology   Wellington Robles 32 y o  male MRN: [id number]  Unit/Bed#: ED 29 Encounter: [id number] 11/28/2017               Assessment/Plan  24 hours of severe left flank and lower quadrant pain  CT shows 6 x 3 millimeter stone with mild hydro      Will give the patient hydration, pain meds, Flomax  Observe overnight, and if no better tomorrow we will proceed with cysto, left ureteroscopy, stone laser and stent placement      We will reassess first thing in the morning before deciding if it is ago      Last episode the stone was 10 years ago                 History of Present Illness            Attending: Halina Zarco MD     Reason for Consult /      Medications  Meds/Allergies      sodium chloride 125 mL/hr Last Rate: 125 mL/hr (11/28/17 1324)        HYDROmorphone    ondansetron  None         Current Facility-Administered Medications:     HYDROmorphone (DILAUDID) 1 mg/mL injection 0 5 mg, 0 5 mg, Intravenous, Q2H PRN, Marguerita Elzbieta, CRNP    nicotine (NICODERM CQ) 7 mg/24hr TD 24 hr patch 1 patch, 1 patch, Transdermal, Daily, Margbrittneyita Lishaidus, CRNP    ondansetron [facility] injection 4 mg, 4 mg, Intravenous, Q6H PRN, Marguerita Lapidus, CRNP    sodium chloride 0 9 % infusion, 125 mL/hr, Intravenous, Continuous, Margkerry Husainidus, CRNP, Last Rate: 125 mL/hr at 11/28/17 1324, 125 mL/hr at 11/28/17 1324    tamsulosin (FLOMAX) capsule 0 4 mg, 0 4 mg, Oral, Once, Marguerita Elzbieta, CRNP, Stopped at 11/28/17 1317  No current outpatient prescriptions on file  Review of Systems  10 point review of systems negative except as noted in HPI  Allergies  No Known Allergies  PMH  Medical History        Past Medical History:   Diagnosis Date    Kidney stones           Past surgical history  Surgical History   History reviewed   No pertinent surgical history  Social history       Social History   Substance Use Topics    Smoking status: Current Every Day Smoker    Smokeless tobacco: Never Used    Alcohol use No      ?  Physical Exam  General appearance: alert, appears stated age and cooperative  Head: Normocephalic, without obvious abnormality, atraumatic  Neck: no adenopathy, no carotid bruit, no JVD, supple, symmetrical, trachea midline and thyroid not enlarged, symmetric, no tenderness/mass/nodules  Lungs: clear to auscultation bilaterally  Heart: regular rate and rhythm, S1, S2 normal, no murmur, click, rub or gallop  Abdomen: soft, non-tender; bowel sounds normal; no masses,  no organomegaly  Male genitalia: normal  Extremities: extremities normal, atraumatic, no cyanosis or edema  Pulses: 2+ and symmetric  Lymph nodes: Cervical, supraclavicular, and axillary nodes normal   Neurologic: Grossly normal     Consults     Review of Systems     Historical Information         No Known Allergies  Medical History        Past Medical History:   Diagnosis Date    Kidney stones           Surgical History   History reviewed  No pertinent surgical history       Social History             History   Alcohol Use No           History   Drug Use    Types: Marijuana          History   Smoking Status    Current Every Day Smoker   Smokeless Tobacco    Never Used      Family History: non-contributory           Lab Results:   CBC:         Lab Results   Component Value Date     WBC 7 22 11/28/2017     HGB 14 7 11/28/2017     HCT 42 0 11/28/2017     MCV 93 11/28/2017      11/28/2017     MCH 32 7 11/28/2017     MCHC 35 0 11/28/2017     RDW 12 1 11/28/2017     MPV 9 7 11/28/2017     NRBC 0 11/28/2017     CMP:         Lab Results   Component Value Date      11/28/2017      11/28/2017     CO2 24 11/28/2017     ANIONGAP 10 11/28/2017     BUN 19 11/28/2017     CREATININE 1 39 (H) 11/28/2017     GLUCOSE 116 11/28/2017     CALCIUM 8 9 11/28/2017     AST 21 11/28/2017     ALT 26 11/28/2017     ALKPHOS 76 11/28/2017     PROT 7 6 11/28/2017     ALBUMIN 4 1 11/28/2017     BILITOT 0 58 11/28/2017     EGFR 68 11/28/2017         Imaging Studies: I have personally reviewed pertinent reports  Ct Renal Stone Study Abdomen Pelvis Without Contrast     Result Date: 11/28/2017  Narrative: CT ABDOMEN AND PELVIS WITHOUT IV CONTRAST - LOW DOSE RENAL STONE INDICATION: Left flank pain radiating into left groin, difficulty urinating  COMPARISON: None TECHNIQUE:  Low dose thin section CT examination of the abdomen and pelvis was performed without intravenous or oral contrast according to a protocol specifically designed to evaluate for urinary tract calculus  Reformatted images were created in axial,  sagittal, and coronal planes  Evaluation for pathology in the abdomen and pelvis that is unrelated to urinary tract calculi is limited  Radiation dose length product (DLP) for this visit:  145 mGy-cm   This examination, like all CT scans performed in the [facility], was performed utilizing techniques to minimize radiation dose exposure, including the use of iterative reconstruction and automated exposure control  FINDINGS: RIGHT KIDNEY AND URETER: No urinary tract calculi  No hydronephrosis or hydroureter  No perinephric collection  LEFT KIDNEY AND URETER: Mild left hydroureteronephrosis secondary to a 6 x 3 mm calculus in the distal left ureter at or just above the level of the UVJ  URINARY BLADDER: Bladder suboptimally distended, otherwise unremarkable apart from the aforementioned calculus near the UVJ  Bilateral pelvic phleboliths  No significant abnormality in the visualized lung bases  Please note the upper liver, spleen, right adrenal gland and stomach were not fully included on axial images  Limited low radiation dose noncontrast CT evaluation demonstrates no clinically significant abnormality of liver, spleen, pancreas, or adrenal glands   No calcified gallstones or gallbladder wall thickening noted  No ascites  Evaluation of lymphadenopathy is limited in the absence of intravenous contrast  No bulky adenopathy detected on this noncontrast study  Bowel loops appear unremarkable  Limited evaluation demonstrates no evidence to suggest acute appendicitis  No acute fracture or destructive osseous lesion is identified       Impression: Mild left hydroureteronephrosis secondary to a 6 x 3 mm calculus in the distal left ureter at or just above the level of the UVJ  Workstation performed: [id number]         EKG, Pathology, and Other Studies: I have personally reviewed pertinent reports        Counseling / Coordination of Care  Total floor / unit time spent today 35 minutes   Greater than 50% of total time was spent with the patient and / or family counseling and / or coordination of care   Judd Huffman MD

## 2018-01-22 VITALS
DIASTOLIC BLOOD PRESSURE: 80 MMHG | WEIGHT: 140 LBS | HEIGHT: 66 IN | SYSTOLIC BLOOD PRESSURE: 144 MMHG | TEMPERATURE: 97 F | BODY MASS INDEX: 22.5 KG/M2

## 2018-01-23 NOTE — PROGRESS NOTES
Allergies    1  No Known Drug Allergies    Vitals  Signs    Temperature: 97 F, TympanicSystolic: 669, LUE, SittingDiastolic: 80, LUE, SittingHeight: 5 ft 6 inWeight: 140 lb BMI Calculated: 22  6BSA Calculated: 1 72    Assessment    1  History of renal calculi (V13 01) (Z87 442)   2  Family history of Overdose : Father   3  Caffeine use (V49 89) (F15 90)   4  Smokes cigarettes (305 1) (F17 210)    Plan     Renal calculus, left    · Follow-up visit in 1 month Evaluation and Treatment  Follow-up  Status: Complete  Done:  [id number]   Ordered;  For: Renal calculus, left; Ordered By: Amalia Ray Performed:  Due: [id number]    Future Appointments    Date/Time Provider Specialty Site   01/08/2018 10:15 AM Shelbi Winn, [address] [facility]  Urology [address]     Signatures   Electronically signed by : Mei Andres RN; Dec  6 2017 11:46AM EST                       (Author)    Electronically signed by : Esther Guzman MD; Dec  6 2017  4:26PM EST                       (Author)

## 2018-01-23 NOTE — PROGRESS NOTES
Active Problems    1  Smokes cigarettes (305 1) (F17 210)    Current Meds   1  Hydrocodone-Acetaminophen 5-325 MG Oral Tablet; Therapy: (Recorded:[id number]) to Recorded    Allergies    1  No Known Drug Allergies    Vitals  Signs    Temperature: 97 F, Tympanic  Systolic: 109, LUE, Sitting  Diastolic: 80, LUE, Sitting  Height: 5 ft 6 in  Weight: 140 lb   BMI Calculated: 22 6  BSA Calculated: 1 72    Procedure    Procedure: Stent Removal      Assessment    1  History of renal calculi (V13 01) (Z87 442)   2  Family history of Overdose : Father   3  Caffeine use (V49 89) (F15 90)   4  Smokes cigarettes (305 1) (F17 210)    Plan  Renal calculus, left    · Follow-up visit in 1 month Evaluation and Treatment  Follow-up  Status: Complete  Done:  [id number]   Ordered;  For: Renal calculus, left; Ordered By: Federico Beltran Performed:  Due: [id number]    Future Appointments    Date/Time Provider Specialty Site   01/08/2018 10:15 AM Holly Scales, [facility] U[facility] [address]     Signatures   Electronically signed by : Tong Quiroga RN; Dec  6 2017 10:49AM EST                       (Author)    Electronically signed by : Rubén Mena MD; Dec  6 2017  4:26PM EST                       (Author)

## 2018-11-04 ENCOUNTER — APPOINTMENT (EMERGENCY)
Dept: RADIOLOGY | Facility: HOSPITAL | Age: 28
End: 2018-11-04
Payer: COMMERCIAL

## 2018-11-04 ENCOUNTER — HOSPITAL ENCOUNTER (OUTPATIENT)
Facility: HOSPITAL | Age: 28
Setting detail: OBSERVATION
Discharge: LEFT BEFORE DISCHARGE | End: 2018-11-04
Attending: EMERGENCY MEDICINE | Admitting: INTERNAL MEDICINE
Payer: COMMERCIAL

## 2018-11-04 VITALS
HEART RATE: 61 BPM | SYSTOLIC BLOOD PRESSURE: 129 MMHG | OXYGEN SATURATION: 97 % | TEMPERATURE: 98.8 F | WEIGHT: 145 LBS | BODY MASS INDEX: 23.4 KG/M2 | DIASTOLIC BLOOD PRESSURE: 58 MMHG | RESPIRATION RATE: 18 BRPM

## 2018-11-04 DIAGNOSIS — I95.9 HYPOTENSION: ICD-10-CM

## 2018-11-04 DIAGNOSIS — R55 SYNCOPE: Primary | ICD-10-CM

## 2018-11-04 PROBLEM — R79.89 ELEVATED SERUM CREATININE: Status: ACTIVE | Noted: 2018-11-04

## 2018-11-04 PROBLEM — F17.200 SMOKER: Status: ACTIVE | Noted: 2018-11-04

## 2018-11-04 PROBLEM — N20.0 KIDNEY STONE ON LEFT SIDE: Status: RESOLVED | Noted: 2017-11-28 | Resolved: 2018-11-04

## 2018-11-04 PROBLEM — E87.6 HYPOKALEMIA: Status: ACTIVE | Noted: 2018-11-04

## 2018-11-04 LAB
ALBUMIN SERPL BCP-MCNC: 3.8 G/DL (ref 3.5–5)
ALP SERPL-CCNC: 87 U/L (ref 46–116)
ALT SERPL W P-5'-P-CCNC: 23 U/L (ref 12–78)
ANION GAP SERPL CALCULATED.3IONS-SCNC: 6 MMOL/L (ref 4–13)
AST SERPL W P-5'-P-CCNC: 18 U/L (ref 5–45)
ATRIAL RATE: 68 BPM
BASOPHILS # BLD AUTO: 0.04 THOUSANDS/ΜL (ref 0–0.1)
BASOPHILS NFR BLD AUTO: 1 % (ref 0–1)
BILIRUB SERPL-MCNC: 0.28 MG/DL (ref 0.2–1)
BUN SERPL-MCNC: 14 MG/DL (ref 5–25)
CALCIUM SERPL-MCNC: 8.8 MG/DL (ref 8.3–10.1)
CHLORIDE SERPL-SCNC: 107 MMOL/L (ref 100–108)
CO2 SERPL-SCNC: 28 MMOL/L (ref 21–32)
CREAT SERPL-MCNC: 1.36 MG/DL (ref 0.6–1.3)
EOSINOPHIL # BLD AUTO: 0.2 THOUSAND/ΜL (ref 0–0.61)
EOSINOPHIL NFR BLD AUTO: 2 % (ref 0–6)
ERYTHROCYTE [DISTWIDTH] IN BLOOD BY AUTOMATED COUNT: 11.8 % (ref 11.6–15.1)
GFR SERPL CREATININE-BSD FRML MDRD: 69 ML/MIN/1.73SQ M
GLUCOSE SERPL-MCNC: 92 MG/DL (ref 65–140)
HCT VFR BLD AUTO: 39.5 % (ref 36.5–49.3)
HGB BLD-MCNC: 13.2 G/DL (ref 12–17)
IMM GRANULOCYTES # BLD AUTO: 0.02 THOUSAND/UL (ref 0–0.2)
IMM GRANULOCYTES NFR BLD AUTO: 0 % (ref 0–2)
LYMPHOCYTES # BLD AUTO: 3.04 THOUSANDS/ΜL (ref 0.6–4.47)
LYMPHOCYTES NFR BLD AUTO: 35 % (ref 14–44)
MCH RBC QN AUTO: 31.4 PG (ref 26.8–34.3)
MCHC RBC AUTO-ENTMCNC: 33.4 G/DL (ref 31.4–37.4)
MCV RBC AUTO: 94 FL (ref 82–98)
MONOCYTES # BLD AUTO: 0.8 THOUSAND/ΜL (ref 0.17–1.22)
MONOCYTES NFR BLD AUTO: 9 % (ref 4–12)
NEUTROPHILS # BLD AUTO: 4.49 THOUSANDS/ΜL (ref 1.85–7.62)
NEUTS SEG NFR BLD AUTO: 53 % (ref 43–75)
NRBC BLD AUTO-RTO: 0 /100 WBCS
P AXIS: 72 DEGREES
PLATELET # BLD AUTO: 223 THOUSANDS/UL (ref 149–390)
PMV BLD AUTO: 9.2 FL (ref 8.9–12.7)
POTASSIUM SERPL-SCNC: 3.3 MMOL/L (ref 3.5–5.3)
PR INTERVAL: 156 MS
PROT SERPL-MCNC: 7.1 G/DL (ref 6.4–8.2)
QRS AXIS: 81 DEGREES
QRSD INTERVAL: 92 MS
QT INTERVAL: 382 MS
QTC INTERVAL: 406 MS
RBC # BLD AUTO: 4.2 MILLION/UL (ref 3.88–5.62)
SODIUM SERPL-SCNC: 141 MMOL/L (ref 136–145)
T WAVE AXIS: 71 DEGREES
T4 FREE SERPL-MCNC: 0.91 NG/DL (ref 0.76–1.46)
TROPONIN I SERPL-MCNC: <0.02 NG/ML
TSH SERPL DL<=0.05 MIU/L-ACNC: 3.64 UIU/ML (ref 0.36–3.74)
VENTRICULAR RATE: 68 BPM
WBC # BLD AUTO: 8.59 THOUSAND/UL (ref 4.31–10.16)

## 2018-11-04 PROCEDURE — 84443 ASSAY THYROID STIM HORMONE: CPT | Performed by: EMERGENCY MEDICINE

## 2018-11-04 PROCEDURE — 80053 COMPREHEN METABOLIC PANEL: CPT | Performed by: EMERGENCY MEDICINE

## 2018-11-04 PROCEDURE — 84484 ASSAY OF TROPONIN QUANT: CPT | Performed by: EMERGENCY MEDICINE

## 2018-11-04 PROCEDURE — 99285 EMERGENCY DEPT VISIT HI MDM: CPT

## 2018-11-04 PROCEDURE — 93005 ELECTROCARDIOGRAM TRACING: CPT

## 2018-11-04 PROCEDURE — 71046 X-RAY EXAM CHEST 2 VIEWS: CPT

## 2018-11-04 PROCEDURE — 36415 COLL VENOUS BLD VENIPUNCTURE: CPT | Performed by: EMERGENCY MEDICINE

## 2018-11-04 PROCEDURE — 84439 ASSAY OF FREE THYROXINE: CPT | Performed by: EMERGENCY MEDICINE

## 2018-11-04 PROCEDURE — 85025 COMPLETE CBC W/AUTO DIFF WBC: CPT | Performed by: EMERGENCY MEDICINE

## 2018-11-04 PROCEDURE — 93010 ELECTROCARDIOGRAM REPORT: CPT | Performed by: INTERNAL MEDICINE

## 2018-11-04 PROCEDURE — 96361 HYDRATE IV INFUSION ADD-ON: CPT

## 2018-11-04 PROCEDURE — 96360 HYDRATION IV INFUSION INIT: CPT

## 2018-11-04 RX ORDER — POTASSIUM CHLORIDE 20 MEQ/1
60 TABLET, EXTENDED RELEASE ORAL ONCE
Status: COMPLETED | OUTPATIENT
Start: 2018-11-04 | End: 2018-11-04

## 2018-11-04 RX ORDER — NICOTINE 21 MG/24HR
1 PATCH, TRANSDERMAL 24 HOURS TRANSDERMAL DAILY
Status: CANCELLED | OUTPATIENT
Start: 2018-11-04

## 2018-11-04 RX ORDER — CALCIUM CARBONATE 200(500)MG
1000 TABLET,CHEWABLE ORAL DAILY PRN
Status: CANCELLED | OUTPATIENT
Start: 2018-11-04

## 2018-11-04 RX ADMIN — POTASSIUM CHLORIDE 60 MEQ: 1500 TABLET, EXTENDED RELEASE ORAL at 04:56

## 2018-11-04 RX ADMIN — SODIUM CHLORIDE 1000 ML: 0.9 INJECTION, SOLUTION INTRAVENOUS at 03:09

## 2018-11-04 RX ADMIN — SODIUM CHLORIDE 1000 ML: 0.9 INJECTION, SOLUTION INTRAVENOUS at 04:58

## 2018-11-04 NOTE — ED NOTES
Pt assisted to stand at bedside to use urinal, pt awaiting room assignment   Mandi Arizmendi, RN  11/04/18 Ozzie Doty RN  11/04/18 [id number]

## 2018-11-04 NOTE — Clinical Note
Case was discussed with SOD and the patient's admission status was agreed to be Admission Status: observation status to the service of Dr Jacquie Diaz

## 2018-11-04 NOTE — ED PROVIDER NOTES
History  Chief Complaint   Patient presents with    Syncope     pt reports etoh and marijuana use tonight - per ems pt had syncopal episode  [facility] staff reported 30 secs of unconsciousness  pt at this time has no complaints     38-year-old man presents for evaluation of syncope  Patient reports feeling flushed and lightheaded upon standing while at the [employer]  He syncopized and came to without postictal period or reported seizure activity  No tongue biting or loss of bladder/bowel  Patient subsequently had 2 more episodes and has since returned to baseline  He reports eating and staying hydrated prior to the event  He drank 2 alcoholic drinks and smoked marijuana  He has had 2 previous syncopal episodes, last 1 year ago  No family history of sudden cardiac death  No exercise induced chest pain or dyspnea  On arrival, patient is hypotensive in the 80 systolic with otherwise normal vital signs  Physical exam is unremarkable including a nonfocal neurologic exam and no external signs of trauma  There is no murmur on exam   Will perform cardiac workup and administer IV fluids  Further workup and management pending above results  None       Past Medical History:   Diagnosis Date    Kidney stones     Syncope and collapse        Past Surgical History:   Procedure Laterality Date    IA CYSTO/URETERO W/LITHOTRIPSY &INDWELL STENT INSRT Left 11/29/2017    Procedure: CYSTOSCOPY URETEROSCOPY WITH LITHOTRIPSY HOLMIUM LASER, RETROGRADE PYELOGRAM AND INSERTION STENT URETERAL;  Surgeon: Lia Monteiro MD;  Location: [facility];  Service: Urology       No family history on file  I have reviewed and agree with the history as documented      Social History   Substance Use Topics    Smoking status: Current Every Day Smoker     Packs/day: 0 50     Years: 6 00    Smokeless tobacco: Never Used    Alcohol use Yes      Comment: socially        Review of Systems   Constitutional: Negative for chills and fever    HENT: Negative for rhinorrhea and sore throat  Eyes: Negative for photophobia and visual disturbance  Respiratory: Negative for cough and shortness of breath  Cardiovascular: Negative for chest pain and leg swelling  Gastrointestinal: Negative for abdominal pain, diarrhea, nausea and vomiting  Genitourinary: Negative for dysuria, frequency and hematuria  Musculoskeletal: Negative for back pain and neck pain  Skin: Negative for rash and wound  Neurological: Positive for syncope and light-headedness  Negative for tremors, seizures and headaches  Physical Exam  ED Triage Vitals   Temperature Pulse Respirations Blood Pressure SpO2   11/04/18 0225 11/04/18 0225 11/04/18 0225 11/04/18 0225 11/04/18 0225   98 8 °F (37 1 °C) 73 14 94/54 95 %      Temp Source Heart Rate Source Patient Position - Orthostatic VS BP Location FiO2 (%)   11/04/18 0225 11/04/18 0359 11/04/18 0700 11/04/18 0225 --   Oral Monitor Lying Right arm       Pain Score       11/04/18 0225       No Pain           Orthostatic Vital Signs  Vitals:    11/04/18 0545 11/04/18 0700 11/04/18 0845 11/04/18 1049   BP: 92/53 96/50 97/51 129/58   Pulse: 66 72 60 61   Patient Position - Orthostatic VS:  Lying Lying        Physical Exam   Constitutional: He is oriented to person, place, and time  He appears well-developed and well-nourished  No distress  HENT:   Head: Normocephalic and atraumatic  Eyes: Pupils are equal, round, and reactive to light  Conjunctivae are normal  No scleral icterus  Neck: Neck supple  No JVD present  Cardiovascular: Normal rate, regular rhythm and normal heart sounds  Exam reveals no gallop and no friction rub  No murmur heard  Pulmonary/Chest: Effort normal and breath sounds normal  No respiratory distress  He has no wheezes  He has no rales  Abdominal: Soft  He exhibits no distension  There is no tenderness  There is no rebound and no guarding     Musculoskeletal: He exhibits no edema or tenderness  Neurological: He is alert and oriented to person, place, and time  Skin: Skin is warm and dry  He is not diaphoretic  Psychiatric: He has a normal mood and affect  His behavior is normal  Thought content normal    Vitals reviewed  ED Medications  Medications   sodium chloride 0 9 % bolus 1,000 mL (0 mL Intravenous Stopped 11/4/18 0456)   potassium chloride (K-DUR,KLOR-CON) CR tablet 60 mEq (60 mEq Oral Given 11/4/18 0456)   sodium chloride 0 9 % bolus 1,000 mL (0 mL Intravenous Stopped 11/4/18 0905)       Diagnostic Studies  Results Reviewed     Procedure Component Value Units Date/Time    Comprehensive metabolic panel [[id number]]  (Abnormal) Collected:  11/04/18 0308    Lab Status:  Final result Specimen:  Blood from Arm, Left Updated:  11/04/18 0349     Sodium 141 mmol/L      Potassium 3 3 (L) mmol/L      Chloride 107 mmol/L      CO2 28 mmol/L      ANION GAP 6 mmol/L      BUN 14 mg/dL      Creatinine 1 36 (H) mg/dL      Glucose 92 mg/dL      Calcium 8 8 mg/dL      AST 18 U/L      ALT 23 U/L      Alkaline Phosphatase 87 U/L      Total Protein 7 1 g/dL      Albumin 3 8 g/dL      Total Bilirubin 0 28 mg/dL      eGFR 69 ml/min/1 73sq m     Narrative:         National Kidney Disease Education Program recommendations are as follows:  GFR calculation is accurate only with a steady state creatinine  Chronic Kidney disease less than 60 ml/min/1 73 sq  meters  Kidney failure less than 15 ml/min/1 73 sq  meters  TSH [[id number]]  (Normal) Collected:  11/04/18 0308    Lab Status:  Final result Specimen:  Blood from Arm, Left Updated:  11/04/18 0349     TSH 3RD GENERATON 3 640 uIU/mL     Narrative:         Patients undergoing fluorescein dye angiography may retain small amounts of fluorescein in the body for 48-72 hours post procedure  Samples containing fluorescein can produce falsely depressed TSH values  If the patient had this procedure,a specimen should be resubmitted post fluorescein clearance  T4, free [id number]  (Normal) Collected:  11/04/18 0308    Lab Status:  Final result Specimen:  Blood from Arm, Left Updated:  11/04/18 0349     Free T4 0 91 ng/dL     Troponin I [[id number]]  (Normal) Collected:  11/04/18 0308    Lab Status:  Final result Specimen:  Blood from Arm, Left Updated:  11/04/18 0342     Troponin I <0 02 ng/mL     CBC and differential [[id number]] Collected:  11/04/18 0308    Lab Status:  Final result Specimen:  Blood from Arm, Left Updated:  11/04/18 0319     WBC 8 59 Thousand/uL      RBC 4 20 Million/uL      Hemoglobin 13 2 g/dL      Hematocrit 39 5 %      MCV 94 fL      MCH 31 4 pg      MCHC 33 4 g/dL      RDW 11 8 %      MPV 9 2 fL      Platelets 232 Thousands/uL      nRBC 0 /100 WBCs      Neutrophils Relative 53 %      Immat GRANS % 0 %      Lymphocytes Relative 35 %      Monocytes Relative 9 %      Eosinophils Relative 2 %      Basophils Relative 1 %      Neutrophils Absolute 4 49 Thousands/µL      Immature Grans Absolute 0 02 Thousand/uL      Lymphocytes Absolute 3 04 Thousands/µL      Monocytes Absolute 0 80 Thousand/µL      Eosinophils Absolute 0 20 Thousand/µL      Basophils Absolute 0 04 Thousands/µL                  XR chest 2 views   ED Interpretation by Ama Little MD (11/04 5650)   No acute disease      Final Result by Cameron Alvarez MD (11/05 0422)      No radiographic evidence of acute intrathoracic process              Workstation performed: [id number]               Procedures  Procedures      Phone Consults  ED Phone Contact    ED Course  ED Course as of Nov 05 1001   Alicia Loss Nov 04, 2018   0430 Procedure Note: EKG  Date/Time: 11/04/18 4:32 AM   Indications / Diagnosis: Syncope  ECG reviewed by me, the ED Provider: yes   The EKG demonstrates:  Rhythm: normal sinus  Intervals: normal intervals  Axis: normal axis  QRS/Blocks: normal QRS  ST Changes: No acute ST Changes, no STD/CELESTINO                                   MDM  Number of Diagnoses or Management Options  Hypotension:   Syncope:   Diagnosis management comments: Patient without further syncopal episodes while in the ED  He remained hypotensive in the 46A systolic despite 2 L IV fluid resuscitation  Cardiac workup unremarkable  Given history of syncope, 3 episodes prior to arrival, and persistent hypotension patient was admitted to [facility] for further management  When they came down to evaluate him the patient decided to sign out against medical advice  His blood pressure at that time was 1:20 a m  9/58  He was given strict return precautions for worsening symptoms and outpatient follow-up  CritCare Time    Disposition  Final diagnoses:   Syncope   Hypotension     Time reflects when diagnosis was documented in both MDM as applicable and the Disposition within this note     Time User Action Codes Description Comment    11/4/2018  7:22 AM Nila Marshall [R55] Syncope     11/4/2018  7:22 AM Magdalena Marshall [I95 [id number]] Hypotension       ED Disposition     ED Disposition Condition Comment    AMA  Patient seen by [facility] and requested to leave AMA  [facility] aware, informed patient on the risks of leaving  Pt still requested to leave after being informed, and signed appropriate paperwork  Follow-up Information     Follow up With Specialties Details Why Contact Info Additional Information    [facility]  Schedule an appointment as soon as possible for a visit  [facility] [address] [place] [phone]  [address] [place] [address] Emergency Department Emergency Medicine  As needed [address] [facility], [address] [place] [address], [facility]          There are no discharge medications for this patient  No discharge procedures on file  ED Provider  Attending physically available and evaluated Nick Byrne I managed the patient along with the ED Attending      Electronically Signed by         Flores Tineo MD  11/05/18 Lb Marshall MD  11/05/18 1003

## 2018-11-04 NOTE — ED ATTENDING ATTESTATION
I, [address], DO, saw and evaluated the patient  I have discussed the patient with the resident/non-physician practitioner and agree with the resident's/non-physician practitioner's findings, Plan of Care, and MDM as documented in the resident's/non-physician practitioner's note, except where noted  All available labs and Radiology studies were reviewed  At this point I agree with the current assessment done in the Emergency Department  I have conducted an independent evaluation of this patient a history and physical is as follows:    53-year-old male presents status post syncopal episode  Patient was at the [facility] and felt a hot flash and passed out  Patient had a total of 3 syncopal episodes  Denies chest pain, shortness of breath  States he currently feels tired  Admits to drinking some alcohol and smoking marijuana tonight  Denies medical problems  On exam-no acute distress, heart regular, lungs clear, abdomen soft nontender, cranial nerves 2-12 intact without focal deficits  Plan-check labs, EKG and reassess  Consider admission secondary to hypotension and multiple syncopal episodes      Critical Care Time  CritCare Time    Procedures

## 2018-11-04 NOTE — ED NOTES
Assumed care of pt at this time, pt rang call bell, upon entering room pt talking on phone    Instructed pt to call when he is finished     [address] [place] [address] [address], RN  11/04/18 6535

## 2018-11-04 NOTE — ED NOTES
Admitting was tiger texted to inform them of the patients wish to leave AMA at this time        Donaldo Patton RN  11/04/18 1127

## 2018-11-04 NOTE — ED NOTES
Admitting responded at this time throught tiger text that they will be right down       Moody Hernandez RN  11/04/18 4412

## 2018-11-04 NOTE — DISCHARGE INSTRUCTIONS
[facility] further management of your episodes of passing out (syncope) and low blood pressure  Return to the ER for new or worrisome symptoms  Syncope   WHAT YOU NEED TO KNOW:   Syncope is also called fainting or passing out  Syncope is a sudden, temporary loss of consciousness, followed by a fall from a standing or sitting position  Syncope ranges from not serious to a sign of a more serious condition that needs to be treated  You can control some health conditions that cause syncope  Your healthcare providers can help you create a plan to manage syncope and prevent episodes  DISCHARGE INSTRUCTIONS:   Seek care immediately if:   · You are bleeding because you hit your head when you fainted  · You suddenly have double vision, difficulty speaking, numbness, and cannot move your arms or legs  · You have chest pain and trouble breathing  · You vomit blood or material that looks like coffee grounds  · You see blood in your bowel movement  Contact your healthcare provider if:   · You have new or worsening symptoms  · You have another syncope episode  · You have a headache, fast heartbeat, or feel too dizzy to stand up  · You have questions or concerns about your condition or care  Follow up with your healthcare provider as directed:  Write down your questions so you remember to ask them during your visits  Manage syncope:   · Keep a record of your syncope episodes  Include your symptoms and your activity before and after the episode  The record can help your healthcare provider find the cause of your syncope and help you manage episodes  · Sit or lie down when needed  This includes when you feel dizzy, your throat is getting tight, and your vision changes  Raise your legs above the level of your heart  · Take slow, deep breaths if you start to breathe faster with anxiety or fear    This can help decrease dizziness and the feeling that you might faint      · Check your blood pressure often  This is important if you take medicine to lower your blood pressure  Check your blood pressure when you are lying down and when you are standing  Ask how often to check during the day  Keep a record of your blood pressure numbers  Your healthcare provider may use the record to help plan your treatment  Prevent a syncope episode:   · Move slowly and let yourself get used to one position before you move to another position  This is very important when you change from a lying or sitting position to a standing position  Take some deep breaths before you stand up from a lying position  Stand up slowly  Sudden movements may cause a fainting spell  Sit on the side of the bed or couch for a few minutes before you stand up  If you are on bedrest, try to be upright for about 2 hours each day, or as directed  Do not lock your legs if you are standing for a long period of time  Move your legs and bend your knees to keep blood flowing  · Follow your healthcare provider's recommendations  Your provider may  recommend that you drink more liquids to prevent dehydration  You may also need to have more salt to keep your blood pressure from dropping too low and causing syncope  Your provider will tell you how much liquid and sodium to have each day  · Watch for signs of low blood sugar  These include hunger, nervousness, sweating, and fast or fluttery heartbeats  Talk with your healthcare provider about ways to keep your blood sugar level steady  · Do not strain if you are constipated  You may faint if you strain to have a bowel movement  Walking is the best way to get your bowels moving  Eat foods high in fiber to make it easier to have a bowel movement  Good examples are high-fiber cereals, beans, vegetables, and whole-grain breads  Prune juice may help make bowel movements softer  · Be careful in hot weather  Heat can cause a syncope episode   Limit activity done outside on hot days  Physical activity in hot weather can lead to dehydration  This can cause an episode  © 2017 [address] Information is for End User's use only and may not be sold, redistributed or otherwise used for commercial purposes  All illustrations and images included in CareNotes® are the copyrighted property of A D A M , Inc  or Paulino Tejeda  The above information is an  only  It is not intended as medical advice for individual conditions or treatments  Talk to your doctor, nurse or pharmacist before following any medical regimen to see if it is safe and effective for you  Hypotension   WHAT YOU NEED TO KNOW:   Hypotension is a condition that causes your blood pressure (BP) to drop lower than it should be  Hypotension may be mild, serious, or life-threatening  DISCHARGE INSTRUCTIONS:   Medicines:   · Alpha-adrenoreceptor agonists: These medicines may increase your BP and decrease your symptoms  Take these medicines as directed  · Steroids: This medicine helps prevent salt loss from your body  Steroids may also help increase the amount of fluid in your body and raise your BP  · Vasopressors: These medicines help constrict (make smaller) your blood vessels and increase your BP  Vasopressor medicines may increase the blood flow to your brain and help decrease your symptoms  · Antidiuretic hormone: This medicine helps control your BP and helps decrease your need to urinate during the night  · Antiparkinson medicine: This medicine may help increase your standing BP and decrease your symptoms  · Take your medicine as directed  Contact your healthcare provider if you think your medicine is not helping or if you have side effects  Tell him of her if you are allergic to any medicine  Keep a list of the medicines, vitamins, and herbs you take  Include the amounts, and when and why you take them   Bring the list or the pill bottles to follow-up visits  Carry your medicine list with you in case of an emergency  Follow up with your healthcare provider or specialist as directed:  Write down your questions so you remember to ask them during your visits  Check your blood pressure: You may need to check your BP at home  Record the results and bring them with you to follow-up visits  Ask when and how often to check your BP  You may need to wear a BP monitor for up to 24 hours  This will record your blood pressure during your normal daily activities  The monitor will take your BP every 15 to 30 minutes  Try to keep still while your BP is taken  Avoid heavy activity, such as exercise, while you are wearing the monitor  Manage your symptoms:   · Change positions slowly:  When you get out of bed, sit up first, then slowly move your legs to the side of the bed  If you are not having any symptoms, slowly stand up  If you have symptoms, sit down right away  · Exercise and do physical counter maneuvers:  Ask your healthcare provider or specialist about the best exercise plan for you  Physical counter maneuvers may help to increase your BP and increase blood flow to your heart  They include crossing your legs, squatting, and bending at the waist  You can also rise up on your toes while you are standing, and tighten your thigh muscles  · Drink liquids as directed:  Ask your healthcare provider or specialist how much liquid to drink each day and which liquids are best for you  Drink 500 milliliters (½ liter) of liquid quickly in the morning or before meals to help increase your BP  Your healthcare provider may tell you to drink 2 cups of coffee with, or after, breakfast and lunch  The caffeine in the coffee can help prevent a drop in your BP  Your healthcare provider may also give you caffeine pills  · Change how you eat meals: If your BP drops after eating large meals, try to eat smaller meals more often   Eat foods low in carbohydrates and cholesterol to help prevent BP drops after you eat  Ask if you need to increase the amount of sodium (salt) you eat each day  · Raise the head of your bed:  Raise the head of your bed 4 to 8 inches  This may help prevent morning BP drops and decrease the need to urinate during the night  Avoid things that make your hypotension worse:   · Do not drink alcohol:  Alcohol can make your symptoms worse  Ask for information if you need help quitting  · Avoid straining:  Activities and movements that cause you to strain can cause a drop in your BP  Activities to avoid include lifting, coughing, and other movements that increase the feeling of pressure in your chest     · Avoid the heat: This can cause a decrease in your BP  Stay inside during very hot days, or limit the amount of time you are outside  Do not take hot baths  Contact your healthcare provider or specialist if:   · You vomit several times or have diarrhea, and you cannot drink liquid  · You have a fever  · You have new or increased symptoms, such as dizziness, weakness, or fainting  · Your legs, ankles, and feet are swollen, or you gain weight for no known reason  · You have questions or concerns about your condition or care  Return to the emergency department if:   · You become confused or cannot speak  · You urinate very little or not at all  · You have a seizure  · You have chest pain or trouble breathing  · You have changes in vision or cannot see  © 2017 [address] Information is for End User's use only and may not be sold, redistributed or otherwise used for commercial purposes  All illustrations and images included in CareNotes® are the copyrighted property of A D A M , Inc  or Paulino Tejeda  The above information is an  only  It is not intended as medical advice for individual conditions or treatments   Talk to your doctor, nurse or pharmacist before following any medical regimen to see if it is safe and effective for you

## 2018-11-04 NOTE — H&P
INTERNAL MEDICINE HISTORY AND PHYSICAL  ED 05 SOD Team B     NAME: Gayla Patotn  AGE: 25 y o  SEX: male  : 1990   MRN: [id number]  ENCOUNTER: [id number]    DATE: 2018  TIME: 9:25 AM    Primary Care Physician: No primary care provider on file  Admitting Provider: Lindsay Hamilton MD    Chief complaint:  Syncope    History of Present Illness     Gayla Patton is a 25 y o  male past medical history significant for kidney stones and syncope with collapse 1 year ago  Patient states he was at the same as could see no last night at 1:00 a m  When he started feeling hot, diaphoretic, presyncopal, had blurry vision and soft spots, felt panicky  He stated that he had just stood up because he had to urinate all of a sudden  Patient states that the next thing he remembered he was awake on the floor  Patient had some mild postictal confusion  According to witnesses patient may have hit head on table  Patient states that none of the witnesses noticed any tonic clonic movements  Patient denied any urinary incontinence, tongue biting  Patient states that he stood up again and passed out in a similar fashion as prior  Patient states that both times his time down was about 30 seconds  Patient states that he had 3 alcoholic beverages prior to the collapse and also smoked marijuana earlier in the night  Patient states is the 1st time he drank alcohol in 2 months  Patient refers a lot of stress recently  Patient states that he had a similar episode about a year ago  Patient denies any family history of sudden cardiac death or any cardiac conditions  Patient states his father  of a a drug overdose at an early age  Patient is an every day smoker  Patient denies any recent illness  On arrival to the emergency department, patient was found to be hypotensive with systolic blood pressures in the 80s  Patient was mildly bradycardic at times but hemodynamically stable otherwise  Patient was administered 2 boluses of normal saline  BMP showed a potassium of 3 3 for which he was given 60 mg K-Dur p o  Cathezra Dodge Creatinine was 1 36, though this was also elevated at admission 1 year ago  TSH/free T4 within normal limits  Troponin negative x1  EKG with normal sinus rhythm with ventricular rate of 68  There were some mild T-wave inversions in aVL, V2  On telemetry, patient found dead have sinus bradycardia as well as sinus arrhythmia  Chest x-ray showed no acute cardiopulmonary abnormality-though official read is pending  Review of Systems   Review of Systems   Constitutional: Negative for appetite change, chills, diaphoresis, fatigue, fever and unexpected weight change  HENT: Negative for sore throat  Eyes: Positive for visual disturbance  Respiratory: Negative for cough, chest tightness, shortness of breath and wheezing  Cardiovascular: Negative for chest pain, palpitations and leg swelling  Gastrointestinal: Positive for nausea  Negative for abdominal distention, abdominal pain, blood in stool, constipation, diarrhea and vomiting  Genitourinary: Positive for urgency  Negative for difficulty urinating and flank pain  Musculoskeletal: Negative for arthralgias and myalgias  Skin: Negative for pallor and rash  Neurological: Positive for syncope and light-headedness  Negative for dizziness, tremors, seizures, facial asymmetry, speech difficulty, weakness, numbness and headaches         Past Medical History     Past Medical History:   Diagnosis Date    Kidney stones     Syncope and collapse        Past Surgical History     Past Surgical History:   Procedure Laterality Date    ME CYSTO/URETERO W/LITHOTRIPSY &INDWELL STENT INSRT Left 11/29/2017    Procedure: CYSTOSCOPY URETEROSCOPY WITH LITHOTRIPSY HOLMIUM LASER, RETROGRADE PYELOGRAM AND INSERTION STENT URETERAL;  Surgeon: Effie Adkins MD;  Location: [facility];  Service: Urology       Social History     History   Alcohol Use    Yes     Comment: socially     History   Drug Use    Types: Marijuana     History   Smoking Status    Current Every Day Smoker    Packs/day: 0 50    Years: 6 00   Smokeless Tobacco    Never Used       Family History   No family history on file  Medications Prior to Admission     Prior to Admission medications    Medication Sig Start Date End Date Taking? Authorizing Provider   HYDROcodone-acetaminophen (NORCO) 5-325 mg per tablet Take 1 tablet by mouth every 4 (four) hours as needed for pain for up to 30 doses Max Daily Amount: 6 tablets 11/29/17 11/4/18  Saundra Currie MD   ondansetron [facility]) 4 mg tablet Take 1 tablet by mouth every 8 (eight) hours as needed for nausea or vomiting 11/29/17 11/4/18  Saundra Currie MD   phenazopyridine (PYRIDIUM) 200 mg tablet Take 1 tablet by mouth 3 (three) times a day with meals 11/29/17 11/4/18  Saundra Currie MD   tamsulosin [facility]) 0 4 mg Take 1 capsule by mouth once for 1 dose 11/29/17 11/4/18  Saundra Currie MD       Allergies   No Known Allergies    Objective     Vitals:    11/04/18 0530 11/04/18 0545 11/04/18 0700 11/04/18 0845   BP: (!) 89/54 92/53 96/50 97/51   BP Location: Right arm Right arm Right arm Right arm   Pulse: 66 66 72 60   Resp: 15 15 (!) 24 18   Temp:       TempSrc:       SpO2: 95% 95% 96% 96%   Weight:         Body mass index is 23 4 kg/m²  No intake or output data in the 24 hours ending 11/04/18 0925  Invasive Devices     Peripheral Intravenous Line            Peripheral IV 11/04/18 Left Antecubital less than 1 day              Physical Exam   Constitutional: He is oriented to person, place, and time  He appears well-developed and well-nourished  No distress  59-year-old male with BMI of 23 resting comfortably in bed   HENT:   Head: Normocephalic and atraumatic  Mouth/Throat: No oropharyngeal exudate  No tenderness to palpation along head   Eyes: Pupils are equal, round, and reactive to light  Conjunctivae are normal  No scleral icterus  Neck: Normal range of motion  Neck supple  No JVD present  No thyromegaly present  Cardiovascular: Normal rate, normal heart sounds and intact distal pulses  Exam reveals no gallop and no friction rub  No murmur heard  Sinus arrhythmia on telemetry, sinus bradycardia   Pulmonary/Chest: Effort normal  No respiratory distress  He has wheezes (Mild expiratory wheezes)  He has no rales  He exhibits no tenderness  Abdominal: Soft  Bowel sounds are normal  He exhibits no distension and no mass  There is no tenderness  There is no rebound and no guarding  Musculoskeletal: Normal range of motion  He exhibits no edema, tenderness or deformity  Lymphadenopathy:     He has no cervical adenopathy  Neurological: He is alert and oriented to person, place, and time  No cranial nerve deficit  He exhibits normal muscle tone  Coordination normal    Skin: Skin is warm and dry  No rash noted  He is not diaphoretic  No erythema  Psychiatric: He has a normal mood and affect  His behavior is normal    Nursing note and vitals reviewed  Lab Results: I have personally reviewed pertinent reports  CBC:   Results from last 7 days  Lab Units 11/04/18  0308   WBC Thousand/uL 8 59   RBC Million/uL 4 20   HEMOGLOBIN g/dL 13 2   HEMATOCRIT % 39 5   MCV fL 94   MCH pg 31 4   MCHC g/dL 33 4   RDW % 11 8   MPV fL 9 2   PLATELETS Thousands/uL 223   NRBC AUTO /100 WBCs 0   NEUTROS PCT % 53   LYMPHS PCT % 35   MONOS PCT % 9   EOS PCT % 2   BASOS PCT % 1   NEUTROS ABS Thousands/µL 4 49   LYMPHS ABS Thousands/µL 3 04   MONOS ABS Thousand/µL 0 80   EOS ABS Thousand/µL 0 20   , Chemistry Profile:   Results from last 7 days  Lab Units 11/04/18  0308   POTASSIUM mmol/L 3 3*   CHLORIDE mmol/L 107   CO2 mmol/L 28   BUN mg/dL 14   CREATININE mg/dL 1 36*   CALCIUM mg/dL 8 8   AST U/L 18   ALT U/L 23   ALK PHOS U/L 87   EGFR ml/min/1 73sq m 69       Imaging: I have personally reviewed pertinent films in PACS  No results found         EKG, Pathology, and Other Studies: I have personally reviewed pertinent reports  Medications given in Emergency Department     Medication Administration - last 24 hours from 11/03/2018 0925 to 11/04/2018 0925       Date/Time Order Dose Route Action Action by     11/04/2018 0456 sodium chloride 0 9 % bolus 1,000 mL 0 mL Intravenous Stopped Rj Waldron RN     11/04/2018 0309 sodium chloride 0 9 % bolus 1,000 mL 1,000 mL Intravenous [address] Rj Waldron RN     11/04/2018 0456 potassium chloride (K-DUR,KLOR-CON) CR tablet 60 mEq 60 mEq Oral Given Rj Waldron RN     11/04/2018 7467 sodium chloride 0 9 % bolus 1,000 mL 0 mL Intravenous Stopped Tanvi Hansen RN     11/04/2018 0458 sodium chloride 0 9 % bolus 1,000 mL 1,000 mL Intravenous New Bag Rj Waldron RN          Assessment and Plan     Problem List     * (Principal)Syncope    Hypokalemia    Hypotension    Smoker        1  Syncope  · Given HPI, appears to be vasovagal versus orthostatic with aspect of possible dehydration  · Troponin negative x1, EKG largely benign  · Hypotensive treated with 2 L normal saline - encourage p o  Intake  · Will order orthostatic vitals  · Will monitor on telemetry  · Will repeat troponin  · Will recheck electrolytes, CBC in the morning  · Monitor hemodynamically and clinically  · Consider this is that the patient's for syncopal episode, consider further syncopal workup    2  Hypotension  · Possibly in the setting of hypovolemia versus baseline hypotension  · Status post 2 L IV NSS  · Continue to encourage p o  Intake  · Monitor hemodynamically and clinically    3  Hypokalemia  · Potassium 3 3 on arrival, repleted with 60 K-Dur p o    · Patient denies any diarrhea, vomiting  · Will recheck BMP in the a m     4  Elevated creatinine on CKD  · Creatinine 1 36, though this appears to be at his baseline of 1 1-1 3  · Unclear etiology, the possibly pre renal  · Will order urinalysis, urine sodium, urine creatinine  · Consider outpatient follow-up with Nephrology to assess for etiology    5  Current every day smoker  ·  Will supply patient with tobacco cessation education  · Will order nicotine patch     Code Status: Level 1 - Full Code  VTE Pharmacologic Prophylaxis: Reason for no pharmacologic prophylaxis Low risk   VTE Mechanical Prophylaxis: reason for no mechanical VTE prophylaxis Low risk, ambulate patient  Admission Status: OBSERVATION    Admission Time  I spent 45 minutes admitting the patient  This involved direct patient contact where I performed a full history and physical, reviewing previous records, and reviewing laboratory and other diagnostic studies      Dannielle Alvares MD  Internal Medicine  PGY-2

## 2018-11-05 NOTE — DISCHARGE SUMMARY
IMR Discharge Summary - Medical Nick Byrne 25 y o  male MRN: [id number]    [facility]  Room / Bed: ED 05/ED 05 Encounter: [id number]    BRIEF OVERVIEW    Admitting Provider: Vivek Marino MD  Discharge Provider: No att  providers found  Primary Care Physician at Discharge: Vivek Marino MD    Discharge To:  home    Admission Date: 11/4/2018     Discharge Date: 11/4/2018 11:45 AM    Primary Discharge Diagnosis  Principal Problem:    Syncope  Active Problems:    Hypokalemia    Hypotension    Smoker    Elevated serum creatinine  Resolved Problems:    * No resolved hospital problems  *      Other Problems Addressed: none     Consulting Providers   None       Therapeutic Operative Procedures Performed  none      Discharge Disposition: Left against medical advice or discontinued care  Discharged With Lines: no    Test Results Pending at Discharge: none    Outpatient Follow-Up  Should follow up with PCP however no PCP post established at this visit  Code Status: full code    Medications   No meds  Allergies  No Known Allergies     Discharge Diet: regular diet    [address]    Patient arrived to the emergency department on 11/04/2018 with presentation presented in H&P as follows:    "Nick Byrne is a 25 y o  male past medical history significant for kidney stones and syncope with collapse 1 year ago  Patient states he was at the same as could see no last night at 1:00 a m  When he started feeling hot, diaphoretic, presyncopal, had blurry vision and soft spots, felt panicky  He stated that he had just stood up because he had to urinate all of a sudden  Patient states that the next thing he remembered he was awake on the floor  Patient had some mild postictal confusion  According to witnesses patient may have hit head on table  Patient states that none of the witnesses noticed any tonic clonic movements    Patient denied any urinary incontinence, tongue biting  Patient states that he stood up again and passed out in a similar fashion as prior  Patient states that both times his time down was about 30 seconds  Patient states that he had 3 alcoholic beverages prior to the collapse and also smoked marijuana earlier in the night  Patient states is the 1st time he drank alcohol in 2 months  Patient refers a lot of stress recently  Patient states that he had a similar episode about a year ago  Patient denies any family history of sudden cardiac death or any cardiac conditions  Patient states his father  of a a drug overdose at an early age  Patient is an every day smoker  Patient denies any recent illness      On arrival to the emergency department, patient was found to be hypotensive with systolic blood pressures in the 80s  Patient was mildly bradycardic at times but hemodynamically stable otherwise  Patient was administered 2 boluses of normal saline  BMP showed a potassium of 3 3 for which he was given 60 mg K-Dur p o  Sheila Burow Creatinine was 1 36, though this was also elevated at admission 1 year ago  TSH/free T4 within normal limits  Troponin negative x1  EKG with normal sinus rhythm with ventricular rate of 68  There were some mild T-wave inversions in aVL, V2  On telemetry, patient found dead have sinus bradycardia as well as sinus arrhythmia  Chest x-ray showed no acute cardiopulmonary abnormality-though official read is pending "    Patient decided to leave AMA  Patient was described the risks of possible recurrence of symptoms, worsening of symptoms, sudden death  Patient was able to understand risks of leaving early without workup and was able to repeat those risks back  Presenting Problem/History of Present Illness  Principal Problem:    Syncope  Active Problems:    Hypokalemia    Hypotension    Smoker    Elevated serum creatinine  Resolved Problems:    * No resolved hospital problems  *          Discharge Condition: Stable, without workup      Discharge  Statement   I spent 30 minutes minutes discharging the patient  This time was spent on the day of discharge  I had direct contact with the patient on the day of discharge  Additional documentation is required if more than 30 minutes were spent on discharge

## 2019-10-28 ENCOUNTER — APPOINTMENT (EMERGENCY)
Dept: RADIOLOGY | Facility: HOSPITAL | Age: 29
End: 2019-10-28
Payer: COMMERCIAL

## 2019-10-28 ENCOUNTER — HOSPITAL ENCOUNTER (EMERGENCY)
Facility: HOSPITAL | Age: 29
Discharge: HOME | End: 2019-10-28
Attending: EMERGENCY MEDICINE
Payer: COMMERCIAL

## 2019-10-28 VITALS
TEMPERATURE: 98 F | SYSTOLIC BLOOD PRESSURE: 110 MMHG | HEIGHT: 66 IN | DIASTOLIC BLOOD PRESSURE: 72 MMHG | WEIGHT: 145.06 LBS | OXYGEN SATURATION: 96 % | HEART RATE: 61 BPM | BODY MASS INDEX: 23.31 KG/M2 | RESPIRATION RATE: 17 BRPM

## 2019-10-28 DIAGNOSIS — S60.221A CONTUSION OF RIGHT HAND, INITIAL ENCOUNTER: ICD-10-CM

## 2019-10-28 DIAGNOSIS — S61.219A FINGER LACERATION: Primary | ICD-10-CM

## 2019-10-28 DIAGNOSIS — S66.394A: ICD-10-CM

## 2019-10-28 PROCEDURE — 73130 X-RAY EXAM OF HAND: CPT

## 2019-10-28 PROCEDURE — 90715 TDAP VACCINE 7 YRS/> IM: CPT | Performed by: EMERGENCY MEDICINE

## 2019-10-28 PROCEDURE — 99284 EMERGENCY DEPT VISIT MOD MDM: CPT | Performed by: EMERGENCY MEDICINE

## 2019-10-28 PROCEDURE — 99283 EMERGENCY DEPT VISIT LOW MDM: CPT

## 2019-10-28 PROCEDURE — 13132 CMPLX RPR F/C/C/M/N/AX/G/H/F: CPT | Performed by: EMERGENCY MEDICINE

## 2019-10-28 PROCEDURE — 90471 IMMUNIZATION ADMIN: CPT

## 2019-10-28 RX ORDER — NAPROXEN 500 MG/1
500 TABLET ORAL ONCE
Status: COMPLETED | OUTPATIENT
Start: 2019-10-28 | End: 2019-10-28

## 2019-10-28 RX ADMIN — NAPROXEN 500 MG: 500 TABLET ORAL at 10:48

## 2019-10-28 RX ADMIN — TETANUS TOXOID, REDUCED DIPHTHERIA TOXOID AND ACELLULAR PERTUSSIS VACCINE, ADSORBED 0.5 ML: 5; 2.5; 8; 8; 2.5 SUSPENSION INTRAMUSCULAR at 10:48

## 2019-10-28 NOTE — ED PROVIDER NOTES
History  Chief Complaint   Patient presents with    Hand Injury     patient punched tv and the television broke; not actively bleeding      Patient complains of right hand pain and multiple lacerations on his fingers after punching a television in his home in anger this morning  He is right handed  He has punched things in the past, incurring lacerations and 5th metacarpal fracture  He states his tetanus is over 11years old  He denies other injury and denies striking a human  He notes that his 4th finger tip is drooping and he is unable to extend it  A few of the lacerations continue to bleed  No other complaints  Denies f/c, HA, CP, SOB, abdominal pain, n/v/d  12 system ROS o/w negative  History provided by:  Patient and medical records  Hand Injury   Associated symptoms: numbness (vague (subjective) area on right thumb)    Associated symptoms: no back pain, no fever and no neck pain    Finger Laceration   Location:  Finger  Finger laceration location:  R thumb, R ring finger and R little finger  Length: Total = 3 cm  Laceration depth: varies - cutaneous and through dermis  Quality: straight    Bleeding: venous and controlled with pressure    Time since incident:  30 minutes  Laceration mechanism:  Blunt object and broken glass  Pain details:     Quality:  Tingling and numbness    Severity:  No pain    Timing:  Constant    Progression:  Unchanged  Foreign body present:  Glass  Relieved by:  None tried  Worsened by:   Movement  Ineffective treatments:  None tried  Tetanus status:  Out of date  Associated symptoms: focal weakness (distal right fourth fingertip) and numbness (vague (subjective) area on right thumb)    Associated symptoms: no fever, no rash, no redness, no swelling and no streaking        None       Past Medical History:   Diagnosis Date    Kidney stones     Syncope and collapse        Past Surgical History:   Procedure Laterality Date    NM CYSTO/URETERO W/LITHOTRIPSY &INDWELL STENT INSRT Left 11/29/2017    Procedure: CYSTOSCOPY URETEROSCOPY WITH LITHOTRIPSY HOLMIUM LASER, RETROGRADE PYELOGRAM AND INSERTION STENT URETERAL;  Surgeon: Karan Warren MD;  Location: [facility];  Service: Urology       History reviewed  No pertinent family history  I have reviewed and agree with the history as documented  Social History     Tobacco Use    Smoking status: Current Every Day Smoker     Packs/day: 1 00     Years: 6 00     Pack years: 6 00    Smokeless tobacco: Never Used   Substance Use Topics    Alcohol use: Yes     Comment: socially    Drug use: Yes     Types: Marijuana        Review of Systems   Constitutional: Negative for chills and fever  HENT: Negative  Eyes: Negative  Respiratory: Negative for shortness of breath  Cardiovascular: Negative for chest pain and palpitations  Gastrointestinal: Negative for abdominal pain, diarrhea, nausea and vomiting  Genitourinary: Negative  Musculoskeletal: Negative for back pain and neck pain  Skin: Positive for wound (multiple, right fingers)  Negative for rash  Neurological: Positive for focal weakness (distal right fourth fingertip)  Negative for syncope and light-headedness  Psychiatric/Behavioral: Negative  All other systems reviewed and are negative  Physical Exam  Physical Exam   Constitutional: He is oriented to person, place, and time  He appears well-developed and well-nourished  No distress  HENT:   Head: Normocephalic and atraumatic  Mouth/Throat: Oropharynx is clear and moist    Eyes: Pupils are equal, round, and reactive to light  Conjunctivae and EOM are normal  No scleral icterus  Neck: Normal range of motion  Neck supple  Cardiovascular: Normal rate and regular rhythm  No murmur heard  Pulmonary/Chest: Effort normal and breath sounds normal  No respiratory distress  Musculoskeletal: He exhibits deformity (right fourth finger DIP joint)  He exhibits no edema     unable to extend right fourth finger at the DIP joint   Neurological: He is alert and oriented to person, place, and time  Skin: Skin is warm and dry  Capillary refill takes less than 2 seconds  Psychiatric: He has a normal mood and affect  His behavior is normal  Thought content normal    Vitals reviewed  Vital Signs  ED Triage Vitals   Temperature Pulse Respirations Blood Pressure SpO2   10/28/19 1036 10/28/19 1036 10/28/19 1036 10/28/19 1036 10/28/19 1036   98 °F (36 7 °C) 67 18 118/58 97 %      Temp Source Heart Rate Source Patient Position - Orthostatic VS BP Location FiO2 (%)   10/28/19 1036 10/28/19 1100 10/28/19 1036 10/28/19 1036 --   Temporal Monitor Lying Left arm       Pain Score       10/28/19 1036       No Pain           Vitals:    10/28/19 1036 10/28/19 1045 10/28/19 1100   BP: 118/58 118/58 110/72   Pulse: 67  61   Patient Position - Orthostatic VS: Lying  Lying         Visual Acuity      ED Medications  Medications   tetanus-diphtheria-acellular pertussis (BOOSTRIX) IM injection 0 5 mL (0 5 mL Intramuscular Given 10/28/19 1048)   naproxen (NAPROSYN) tablet 500 mg (500 mg Oral Given 10/28/19 1048)       Diagnostic Studies  Results Reviewed     None                 XR hand 3+ views RIGHT   ED Interpretation by Chencho Paredes DO (10/28 1105)   No appreciable fractures  Procedures  Procedures       ED Course                               MDM  Number of Diagnoses or Management Options  Contusion of right hand, initial encounter:   Finger lacerations - right thumb, ring and small:   Other injury of extensor muscle, fascia and tendon of right ring finger at wrist and hand level, initial encounter:   Diagnosis management comments: DDx:  Lacerations of multiple fingers on the right hand w/o clinical evidence of nerve damage, cellulitis or abscess  There is likely blunt injury to the distal extensor tendon of the right ring finger without evidence of direct laceration      A/P: Will cleanse and close wounds, update dT, splint the ring finger and refer to hand surgery  Amount and/or Complexity of Data Reviewed  Tests in the radiology section of CPT®: reviewed and ordered  Review and summarize past medical records: yes  Independent visualization of images, tracings, or specimens: yes        Disposition  Final diagnoses:   Finger lacerations - right thumb, ring and small   Other injury of extensor muscle, fascia and tendon of right ring finger at wrist and hand level, initial encounter   Contusion of right hand, initial encounter     Time reflects when diagnosis was documented in both MDM as applicable and the Disposition within this note     Time User Action Codes Description Comment    10/28/2019 11:38 AM Henrine Albanian Add [E13 939U] Finger laceration     10/28/2019 11:39 AM Henrine Albanian Modify [N98 013O] Finger lacerations - right thumb, ring and small     10/28/2019 11:39  [facility] [T52 443A] Other injury of extensor muscle, fascia and tendon of right ring finger at wrist and hand level, initial encounter     10/28/2019 11:40 AM Henrine Albanian Add [T83 700C] Contusion of right hand, initial encounter       ED Disposition     ED Disposition Condition Date/Time Comment    Discharge Stable Mon Oct 28, 2019 11:40 AM Cloyde Abts discharge to home/self care  Follow-up Information     Follow up With Specialties Details Why Contact Info    [facility] Pl[facility] Surgery Call today for further evaluation and treatment of the ring finger tendon injury Janay Mays [phone] [address]  [phone]            There are no discharge medications for this patient  No discharge procedures on file      ED Provider  Electronically Signed by           Bebo Butler DO  10/28/19 1365

## 2019-10-28 NOTE — ED PROCEDURE NOTE
PROCEDURE  Splint application  Date/Time: 10/28/2019 11:33 AM  Performed by: Emma Wyman DO  Authorized by: Emma Wyman DO     Patient location:  Bedside  Procedure performed by emergency physician: Yes    Consent:     Consent obtained:  Verbal    Consent given by:  Patient    Risks discussed:  Discoloration, numbness, pain and swelling  Universal protocol:     Procedure explained and questions answered to patient or proxy's satisfaction: yes      Relevant documents present and verified: yes      Test results available and properly labeled: yes      Radiology Images displayed and confirmed  If images not available, report reviewed: yes      Patient identity confirmed:  Verbally with patient and arm band  Indication:     Indications: other medical problem (comment)      Indications comment:  Tendon disruption  Pre-procedure details:     Sensation:  Normal    Skin color:  Pink  Procedure details:     Laterality:  Right    Location:  Finger    Finger:  R ring finger    Supplies:  Aluminum splint  Post-procedure details:     Pain:  Improved    Sensation:  Normal    Neurovascular Exam: skin pink, capillary refill <2 sec and skin intact, warm, and dry      Patient tolerance of procedure:   Tolerated well, no immediate complications         Emma Wyman DO  10/28/19 1134

## 2019-10-28 NOTE — ED NOTES
Pt has several small lacerations on his right hand and all his fingers from punching a television  Range in size from 0 5 to 1cm in length       Cayla Alcantara RN  10/28/19 3859

## 2019-10-28 NOTE — ED PROCEDURE NOTE
PROCEDURE  Laceration repair  Date/Time: 10/28/2019 11:24 AM  Performed by: Dorothy Velasquez DO  Authorized by: Dorothy Velasquez DO   Consent: Verbal consent obtained  Consent given by: patient  Patient understanding: patient states understanding of the procedure being performed  Patient consent: the patient's understanding of the procedure matches consent given  Procedure consent: procedure consent matches procedure scheduled  Patient identity confirmed: verbally with patient and arm band  Body area: upper extremity (right thumb, right ring finger and right small finger)  Wound length (cm): total lengths of 3 cm  Contamination: The wound is contaminated  Foreign bodies: glass  Tendon involvement: no apparent laceration of the right ring finger distal extensor tendon but patient is unable to move the fingertip  Vascular damage: no  Anesthesia: local infiltration    Anesthesia:  Local Anesthetic: lidocaine 1% with epinephrine  Anesthetic total: 2 mL    Sedation:  Patient sedated: no        Procedure Details:  Preparation: Patient was prepped and draped in the usual sterile fashion  Irrigation solution: saline  Irrigation method: tap  Amount of cleaning: standard  Debridement: minimal  Degree of undermining: none  Skin closure: 5-0 nylon  Number of sutures: 11  Suture technique: Running horizontal mattress    Approximation: close  Approximation difficulty: complex  Dressing: splint  Patient tolerance: Patient tolerated the procedure well with no immediate complications           Dorothy Velasquez DO  10/28/19 1133

## 2021-08-30 ENCOUNTER — HOSPITAL ENCOUNTER (EMERGENCY)
Facility: HOSPITAL | Age: 31
Discharge: HOME | End: 2021-08-30
Attending: EMERGENCY MEDICINE | Admitting: EMERGENCY MEDICINE
Payer: COMMERCIAL

## 2021-08-30 VITALS
WEIGHT: 175.49 LBS | BODY MASS INDEX: 28.32 KG/M2 | OXYGEN SATURATION: 97 % | RESPIRATION RATE: 19 BRPM | DIASTOLIC BLOOD PRESSURE: 90 MMHG | TEMPERATURE: 97.5 F | HEART RATE: 79 BPM | SYSTOLIC BLOOD PRESSURE: 136 MMHG

## 2021-08-30 DIAGNOSIS — K08.89 DENTALGIA: Primary | ICD-10-CM

## 2021-08-30 PROCEDURE — 99284 EMERGENCY DEPT VISIT MOD MDM: CPT | Performed by: EMERGENCY MEDICINE

## 2021-08-30 PROCEDURE — 99282 EMERGENCY DEPT VISIT SF MDM: CPT

## 2021-08-30 RX ORDER — NAPROXEN 500 MG/1
500 TABLET ORAL 2 TIMES DAILY WITH MEALS
Qty: 30 TABLET | Refills: 0 | Status: SHIPPED | OUTPATIENT
Start: 2021-08-30 | End: 2022-07-14

## 2021-08-30 RX ORDER — CHLORHEXIDINE GLUCONATE 0.12 MG/ML
15 RINSE ORAL 2 TIMES DAILY
Qty: 210 ML | Refills: 0 | Status: SHIPPED | OUTPATIENT
Start: 2021-08-30 | End: 2021-09-06

## 2021-08-30 RX ORDER — PENICILLIN V POTASSIUM 250 MG/1
500 TABLET ORAL ONCE
Status: COMPLETED | OUTPATIENT
Start: 2021-08-30 | End: 2021-08-30

## 2021-08-30 RX ORDER — NAPROXEN 500 MG/1
500 TABLET ORAL ONCE
Status: COMPLETED | OUTPATIENT
Start: 2021-08-30 | End: 2021-08-30

## 2021-08-30 RX ORDER — PENICILLIN V POTASSIUM 500 MG/1
500 TABLET ORAL 4 TIMES DAILY
Qty: 28 TABLET | Refills: 0 | Status: SHIPPED | OUTPATIENT
Start: 2021-08-30 | End: 2021-09-06

## 2021-08-30 RX ADMIN — NAPROXEN 500 MG: 500 TABLET ORAL at 02:31

## 2021-08-30 RX ADMIN — PENICILLIN V POTASSIUM 500 MG: 250 TABLET, FILM COATED ORAL at 02:30

## 2021-08-30 NOTE — Clinical Note
Claude Garcia was seen and treated in our emergency department on 8/30/2021  Diagnosis:     Clearance Abigail  may return to work on return date  He may return on this date: 08/31/2021         If you have any questions or concerns, please don't hesitate to call        Cedric Varghese MD    ______________________________           _______________          _______________  Hospital Representative                              Date                                Time

## 2021-08-30 NOTE — ED PROVIDER NOTES
History  Chief Complaint   Patient presents with    Dental Pain     L-upper, tooth broken     This is an otherwise healthy 25-year-old male who presents with dental pain  Over the past week, the patient has been experiencing left upper dental pain  He has not taken anything for the pain  He has not contacted a dentist   Pain continues to worsen  Denies any facial swelling, throat swelling, difficulty swallowing/breathing  Denies fever/chills, nausea/vomiting, URI symptoms, chest pain, palpitations, shortness of breath, cough, neck pain, back pain, flank pain, abdominal pain, diarrhea, hematochezia, melena, dysuria  None       Past Medical History:   Diagnosis Date    Kidney stones     Syncope and collapse        Past Surgical History:   Procedure Laterality Date    [place] CYSTO/URETERO W/LITHOTRIPSY &INDWELL STENT INSRT Left 11/29/2017    Procedure: CYSTOSCOPY URETEROSCOPY WITH LITHOTRIPSY HOLMIUM LASER, RETROGRADE PYELOGRAM AND INSERTION STENT URETERAL;  Surgeon: Gamal Harp MD;  Location: [place] [facility] OR;  Service: Urology       History reviewed  No pertinent family history  I have reviewed and agree with the history as documented  E-Cigarette/Vaping    E-Cigarette Use Never User      E-Cigarette/Vaping Substances     Social History     Tobacco Use    Smoking status: Current Every Day Smoker     Packs/day: 1 00     Years: 6 00     Pack years: 6 00    Smokeless tobacco: Never Used   Vaping Use    Vaping Use: Never used   Substance Use Topics    Alcohol use: Yes     Comment: socially    Drug use: Yes     Types: Marijuana       Review of Systems   Constitutional: Negative for chills and fever  HENT: Positive for dental problem  Negative for congestion, rhinorrhea, sore throat and trouble swallowing  Respiratory: Negative for cough, chest tightness, shortness of breath and wheezing  Cardiovascular: Negative for chest pain and palpitations     Gastrointestinal: Negative for abdominal pain, blood in stool, diarrhea, nausea and vomiting  Musculoskeletal: Negative for back pain and neck pain  All other systems reviewed and are negative  Physical Exam  Physical Exam  Constitutional:       Appearance: Normal appearance  He is well-developed  He is not ill-appearing or toxic-appearing  HENT:      Head: Normocephalic  Mouth/Throat:      Dentition: Abnormal dentition  Palate: No lesions  Pharynx: Oropharynx is clear  Uvula midline  No pharyngeal swelling, oropharyngeal exudate, posterior oropharyngeal erythema or uvula swelling  Tonsils: No tonsillar exudate or tonsillar abscesses  Comments: Cracked tooth 15 with overlying tenderness  No swelling or evidence of abscess  Eyes:      General: Lids are normal       Conjunctiva/sclera: Conjunctivae normal       Pupils: Pupils are equal, round, and reactive to light  Cardiovascular:      Rate and Rhythm: Normal rate and regular rhythm  Pulmonary:      Effort: Pulmonary effort is normal  No tachypnea  Abdominal:      General: There is no distension  Palpations: Abdomen is soft  Abdomen is not rigid  Neurological:      Mental Status: He is alert  Psychiatric:         Speech: Speech normal          Behavior: Behavior normal  Behavior is cooperative           Vital Signs  ED Triage Vitals [08/30/21 0215]   Temperature Pulse Respirations Blood Pressure SpO2   97 5 °F (36 4 °C) 79 19 136/90 96 %      Temp Source Heart Rate Source Patient Position - Orthostatic VS BP Location FiO2 (%)   Temporal Monitor Sitting Left arm --      Pain Score       Worst Possible Pain           Vitals:    08/30/21 0215   BP: 136/90   Pulse: 79   Patient Position - Orthostatic VS: Sitting         Visual Acuity      ED Medications  Medications   penicillin V potassium (VEETID) tablet 500 mg (has no administration in time range)   naproxen (NAPROSYN) tablet 500 mg (has no administration in time range)       Diagnostic Studies  Results Reviewed None                 No orders to display              Procedures  Procedures         ED Course                                           MDM  Number of Diagnoses or Management Options  Diagnosis management comments: Penicillin, Peridex, naproxen  Patient given information for dental clinics in the area  Strict return precautions given  Disposition  Final diagnoses:   Kyle Camera     Time reflects when diagnosis was documented in both MDM as applicable and the Disposition within this note     Time User Action Codes Description Comment    8/30/2021  2:25 AM Albaro Ulisesanahi Feliz [[id number] [id number]] Kyle Camera       ED Disposition     ED Disposition Condition Date/Time Comment    Discharge Stable Mon Aug 30, 2021  2:25 AM Lexis Rosario discharge to home/self care  Follow-up Information     Follow up With Specialties Details Why Contact Info Additional [address] [facility] Emergency Department Emergency Medicine Go to  If symptoms worsen [address] [phone]  [address] [facility] [address], [address]          Patient's Medications   Discharge Prescriptions    CHLORHEXIDINE (PERIDEX) 0 12 % SOLUTION    Apply 15 mL to the mouth or throat 2 (two) times a day for 7 days       Start Date: 8/30/2021 End Date: 9/6/2021       Order Dose: 15 mL       Quantity: 210 mL    Refills: 0    NAPROXEN (NAPROSYN) 500 MG TABLET    Take 1 tablet (500 mg total) by mouth 2 (two) times a day with meals       Start Date: 8/30/2021 End Date: --       Order Dose: 500 mg       Quantity: 30 tablet    Refills: 0    PENICILLIN V POTASSIUM (VEETID) 500 MG TABLET    Take 1 tablet (500 mg total) by mouth 4 (four) times a day for 7 days       Start Date: 8/30/2021 End Date: 9/6/2021       Order Dose: 500 mg       Quantity: 28 tablet    Refills: 0     No discharge procedures on file      PDMP Review     None          ED Provider  Electronically Signed by           Donna Gil MD  08/30/21 5255

## 2022-06-08 ENCOUNTER — TELEPHONE (OUTPATIENT)
Dept: FAMILY MEDICINE CLINIC | Facility: CLINIC | Age: 32
End: 2022-06-08

## 2022-07-14 ENCOUNTER — HOSPITAL ENCOUNTER (EMERGENCY)
Facility: HOSPITAL | Age: 32
Discharge: HOME | End: 2022-07-14
Attending: EMERGENCY MEDICINE | Admitting: EMERGENCY MEDICINE
Payer: COMMERCIAL

## 2022-07-14 VITALS
WEIGHT: 155 LBS | HEART RATE: 61 BPM | RESPIRATION RATE: 18 BRPM | HEIGHT: 66 IN | SYSTOLIC BLOOD PRESSURE: 115 MMHG | DIASTOLIC BLOOD PRESSURE: 66 MMHG | BODY MASS INDEX: 24.91 KG/M2 | OXYGEN SATURATION: 97 % | TEMPERATURE: 97.6 F

## 2022-07-14 DIAGNOSIS — M79.5 FOREIGN BODY (FB) IN SOFT TISSUE: Primary | ICD-10-CM

## 2022-07-14 PROCEDURE — 99284 EMERGENCY DEPT VISIT MOD MDM: CPT | Performed by: EMERGENCY MEDICINE

## 2022-07-14 PROCEDURE — 99283 EMERGENCY DEPT VISIT LOW MDM: CPT

## 2022-07-14 RX ORDER — LIDOCAINE HYDROCHLORIDE 10 MG/ML
5 INJECTION, SOLUTION EPIDURAL; INFILTRATION; INTRACAUDAL; PERINEURAL ONCE
Status: COMPLETED | OUTPATIENT
Start: 2022-07-14 | End: 2022-07-14

## 2022-07-14 RX ADMIN — LIDOCAINE HYDROCHLORIDE 5 ML: 10 INJECTION, SOLUTION EPIDURAL; INFILTRATION; INTRACAUDAL; PERINEURAL at 14:18

## 2022-07-14 NOTE — ED PROVIDER NOTES
History  Chief Complaint   Patient presents with    Finger Injury     Patient has a fishing hook in right pointer finger  Happened about 15 min ago  64-year-old male presents for evaluation of right index/2nd finger injury  Patient was using a fishhook when it got stuck in his finger  Patient reports a fishhook is brand new and not been used before  Patient attempted to remove the hook himself however had difficulty  Patient reports his tetanus is up-to-date  None       Past Medical History:   Diagnosis Date    Kidney stones     Syncope and collapse        Past Surgical History:   Procedure Laterality Date    [place] CYSTO/URETERO W/LITHOTRIPSY &INDWELL STENT INSRT Left 11/29/2017    Procedure: CYSTOSCOPY URETEROSCOPY WITH LITHOTRIPSY HOLMIUM LASER, RETROGRADE PYELOGRAM AND INSERTION STENT URETERAL;  Surgeon: Van Young MD;  Location: [facility] OR;  Service: Urology       History reviewed  No pertinent family history  I have reviewed and agree with the history as documented  E-Cigarette/Vaping    E-Cigarette Use Never User      E-Cigarette/Vaping Substances     Social History     Tobacco Use    Smoking status: Current Every Day Smoker     Packs/day: 0 25     Years: 6 00     Pack years: 1 50    Smokeless tobacco: Never Used   Vaping Use    Vaping Use: Never used   Substance Use Topics    Alcohol use: Yes     Comment: socially    Drug use: Yes     Types: Marijuana       Review of Systems   Musculoskeletal: Positive for myalgias  Skin: Positive for wound  All other systems reviewed and are negative  Physical Exam  Physical Exam  Vitals reviewed  Constitutional:       General: He is not in acute distress  Appearance: Normal appearance  He is not ill-appearing, toxic-appearing or diaphoretic  HENT:      Head: Normocephalic and atraumatic  Right Ear: External ear normal       Left Ear: External ear normal       Nose: Nose normal    Eyes:      General: No scleral icterus  Right eye: No discharge  Left eye: No discharge  Cardiovascular:      Rate and Rhythm: Normal rate  Pulmonary:      Effort: Pulmonary effort is normal  No respiratory distress  Musculoskeletal:         General: Signs of injury present  No deformity  Skin:     General: Skin is warm  Coloration: Skin is not jaundiced or pale  Comments: [place] with 3 barbed hooks/ends, only one hook is lodged into finger pad of R index/2nd finger   Neurological:      General: No focal deficit present  Mental Status: He is alert  Mental status is at baseline           Vital Signs  ED Triage Vitals [07/14/22 1333]   Temperature Pulse Respirations Blood Pressure SpO2   97 6 °F (36 4 °C) 61 18 115/66 97 %      Temp Source Heart Rate Source Patient Position - Orthostatic VS BP Location FiO2 (%)   Temporal Monitor Lying Left arm --      Pain Score       --           Vitals:    07/14/22 1333   BP: 115/66   Pulse: 61   Patient Position - Orthostatic VS: Lying         Visual Acuity      ED Medications  Medications   lidocaine (PF) (XYLOCAINE-MPF) 1 % injection 5 mL (5 mL Infiltration Given by Other 7/14/22 9448)       Diagnostic Studies  Results Reviewed     None                 No orders to display              Procedures  Digital Block  Performed by: Minerva Ruiz DO  Authorized by: Minerva Ruiz DO     Consent:     Consent obtained:  Verbal    Consent given by:  Patient  Indications:     Indications:  Procedural anesthesia  Location:     Block location:  Finger    Finger blocked:  R index finger  Pre-procedure details:     Neurovascular status: intact      Skin preparation:  Alcohol  Procedure details (see MAR for exact dosages):     Needle gauge:  25 G    Anesthetic injected:  Lidocaine 1% w/o epi    Technique:  Metacarpal block    Injection procedure:  Anatomic landmarks identified, anatomic landmarks palpated, incremental injection, introduced needle and negative aspiration for blood  Post-procedure details:     Patient tolerance of procedure: Tolerated well, no immediate complications  Foreign Body - Embedded    Date/Time: 7/14/2022 1:50 PM  Performed by: Jesus Patricia DO  Authorized by: Jesus Patricia DO     Patient location:  ED  Other Assisting Provider: No    Consent:     Consent obtained:  Verbal    Consent given by:  Patient  Universal protocol:     Patient identity confirmed:  Verbally with patient  Location:     Location:  Finger    Finger location:  R index finger    Depth:  Subcutaneous    Tendon involvement:  None  Pre-procedure details:     Imaging:  None    Neurovascular status: intact      Preparation: Patient was prepped and draped in usual sterile fashion    Anesthesia (see MAR for exact dosages): Anesthesia method:  Nerve block    Block anesthetic:  Lidocaine 1% w/o epi    Block injection procedure:  Anatomic landmarks identified, anatomic landmarks palpated, introduced needle, negative aspiration for blood and incremental injection    Block outcome:  Anesthesia achieved  Procedure details:     Localization method:  Visualized    Dissection of underlying tissues: no      Bloodless field: no      Removal mechanism: Forceps and hemostat    Procedure complexity:  Simple    Foreign bodies recovered:  1    Description:  Fish hook, deformed but in one piece    Intact foreign body removal: yes    Post-procedure details:     Neurovascular status: intact      Repair method: glue  Dressing: band-aid  Patient tolerance of procedure: Tolerated well, no immediate complications  Comments:      Attempt to dis-engage pearl unsuccessful, hook was trimmed with trauma domo and continued through skin to other side  Able to grab with hemostats and pull through  Hook appears crooked but in one piece when removed               ED Course  ED Course as of 07/16/22 1513   Thu Jul 14, 2022   1335 Tdap 2019                                             MDM  Number of Diagnoses or Management Options  Foreign body (FB) in soft tissue  Diagnosis management comments: 25-year-old male presents for evaluation of facial his finger  Patient does not want to attempt removal with out analgesia  Will perform digital block and attempt removal       Disposition  Final diagnoses:   Foreign body (FB) in soft tissue     Time reflects when diagnosis was documented in both MDM as applicable and the Disposition within this note     Time User Action Codes Description Comment    7/14/2022  1:51 PM Taty Feliz [M79 5] Foreign body (FB) in soft tissue       ED Disposition     ED Disposition   Discharge    Condition   Stable    Date/Time   Thu Jul 14, 2022  2:24 PM    [facility] discharge to home/self care  Follow-up Information     Follow up With Specialties Details Why Contact Info Additional [address] [facility] Emergency Department Emergency Medicine  If symptoms worsen [place] [phone] [address] [facility] Emergency Department[address], [facility]          There are no discharge medications for this patient  No discharge procedures on file      PDMP Review     None          ED Provider  Electronically Signed by           Sandra Lindsay DO  07/16/22 2364

## 2023-05-11 ENCOUNTER — OFFICE VISIT (OUTPATIENT)
Dept: FAMILY MEDICINE CLINIC | Facility: CLINIC | Age: 33
End: 2023-05-11

## 2023-05-11 VITALS
HEART RATE: 70 BPM | BODY MASS INDEX: 25.58 KG/M2 | DIASTOLIC BLOOD PRESSURE: 76 MMHG | HEIGHT: 66 IN | TEMPERATURE: 98.5 F | SYSTOLIC BLOOD PRESSURE: 104 MMHG | WEIGHT: 159.2 LBS | OXYGEN SATURATION: 95 %

## 2023-05-11 DIAGNOSIS — Z87.442 PERSONAL HISTORY OF RENAL CALCULI: ICD-10-CM

## 2023-05-11 DIAGNOSIS — H54.7 POOR VISION: Primary | ICD-10-CM

## 2023-05-11 DIAGNOSIS — Z13.89 SCREENING FOR MULTIPLE CONDITIONS: ICD-10-CM

## 2023-05-11 PROBLEM — I95.9 HYPOTENSION: Status: RESOLVED | Noted: 2018-11-04 | Resolved: 2023-05-11

## 2023-05-11 PROBLEM — E87.6 HYPOKALEMIA: Status: RESOLVED | Noted: 2018-11-04 | Resolved: 2023-05-11

## 2023-05-11 PROBLEM — R79.89 ELEVATED SERUM CREATININE: Status: RESOLVED | Noted: 2018-11-04 | Resolved: 2023-05-11

## 2023-05-11 PROBLEM — F17.200 SMOKER: Status: RESOLVED | Noted: 2018-11-04 | Resolved: 2023-05-11

## 2023-05-11 PROBLEM — R55 SYNCOPE: Status: RESOLVED | Noted: 2018-11-04 | Resolved: 2023-05-11

## 2023-05-11 NOTE — ASSESSMENT & PLAN NOTE
[facility] so if further speciality needs arise he can be referred to [facility]

## 2023-05-11 NOTE — PROGRESS NOTES
Name: Marielena Perez      : 1990      MRN: [id number]  Encounter Provider: Alex Darnell PA-C  Encounter Date: 2023   Encounter department: [address]     1  Poor vision  Assessment & Plan:  Referral given to [facility] is associated with their office so if further speciality needs arise he can be referred to [facility]  Orders:  -     Ambulatory Referral to Ophthalmology; Future    2  Screening for multiple conditions  Assessment & Plan:  Screening labs ordered, pt to have drawn at his convenience  Orders:  -     CBC; Future  -     Comprehensive metabolic panel; Future  -     Lipid Panel with Direct LDL reflex; Future    3  Personal history of renal calculi  Assessment & Plan:  Pt requesting screening imaging, will evaluate with KUB  Orders:  -     XR abdomen 1 view kub; Future; Expected date: 2023      BMI Counseling: Body mass index is 25 7 kg/m²  The BMI is above normal  Nutrition recommendations include decreasing portion sizes, encouraging healthy choices of fruits and vegetables, decreasing fast food intake, consuming healthier snacks, limiting drinks that contain sugar, moderation in carbohydrate intake, increasing intake of lean protein, reducing intake of saturated and trans fat and reducing intake of cholesterol  Exercise recommendations include moderate physical activity 150 minutes/week  Rationale for BMI follow-up plan is due to patient being overweight or obese  Depression Screening and Follow-up Plan: Patient was screened for depression during today's encounter  They screened negative with a PHQ-2 score of 0  Tobacco Cessation Counseling: Tobacco cessation counseling was provided   The patient is sincerely urged to quit consumption of tobacco  He is not ready to quit tobacco          Giulia Cole is here today requesting referral to ophthalmologist    He has poor vision bilaterally, states has had "this since childhood  He is unsure what his actual ophthalmologic diagnosis is  He wears glasses, states both eyes are affected  Pt also with h/o renal calculi, requesting xray to evaluate to see if he currently has any stones despite being asymptomatic at this time  Review of Systems   Constitutional: Negative for activity change, appetite change, chills, diaphoresis, fatigue, fever and unexpected weight change  HENT: Negative for congestion, ear pain, postnasal drip, rhinorrhea, sinus pressure, sinus pain, sneezing, sore throat, tinnitus and voice change  Eyes: Positive for visual disturbance  Negative for pain and redness  Respiratory: Negative for cough, chest tightness, shortness of breath and wheezing  Cardiovascular: Negative for chest pain, palpitations and leg swelling  Gastrointestinal: Negative for abdominal pain, blood in stool, constipation, diarrhea, nausea and vomiting  Genitourinary: Negative for difficulty urinating, dysuria, frequency, hematuria and urgency  Musculoskeletal: Negative for arthralgias, back pain, gait problem, joint swelling, myalgias, neck pain and neck stiffness  Skin: Negative for color change, pallor, rash and wound  Neurological: Negative for dizziness, tremors, weakness, light-headedness and headaches  Psychiatric/Behavioral: Negative for dysphoric mood, self-injury, sleep disturbance and suicidal ideas  The patient is not nervous/anxious  Current Outpatient Medications on File Prior to Visit   Medication Sig   • Multiple Vitamin (MULTI VITAMIN DAILY PO) Take by mouth       Objective     /76   Pulse 70   Temp 98 5 °F (36 9 °C)   Ht 5' 6\" (1 676 m)   Wt 72 2 kg (159 lb 3 2 oz)   SpO2 95%   BMI 25 70 kg/m²     Physical Exam  Vitals reviewed  Constitutional:       General: He is not in acute distress  Appearance: He is well-developed  He is not diaphoretic  HENT:      Head: Normocephalic and atraumatic        Right Ear: " Hearing, tympanic membrane, ear canal and external ear normal       Left Ear: Hearing, tympanic membrane, ear canal and external ear normal       Mouth/Throat:      Pharynx: Uvula midline  No oropharyngeal exudate  Eyes:      General: No scleral icterus  Right eye: No discharge  Left eye: No discharge  Conjunctiva/sclera: Conjunctivae normal    Neck:      Thyroid: No thyromegaly  Vascular: No carotid bruit  Cardiovascular:      Rate and Rhythm: Normal rate and regular rhythm  Heart sounds: Normal heart sounds  No murmur heard  Pulmonary:      Effort: Pulmonary effort is normal  No respiratory distress  Breath sounds: Normal breath sounds  No wheezing  Abdominal:      General: Bowel sounds are normal  There is no distension  Palpations: Abdomen is soft  There is no mass  Tenderness: There is no abdominal tenderness  There is no right CVA tenderness, left CVA tenderness, guarding or rebound  Musculoskeletal:         General: No tenderness  Normal range of motion  Cervical back: Neck supple  Lymphadenopathy:      Cervical: No cervical adenopathy  Skin:     General: Skin is warm and dry  Findings: No erythema or rash  Neurological:      Mental Status: He is alert and oriented to person, place, and time  Psychiatric:         Behavior: Behavior normal          Thought Content:  Thought content normal          Judgment: Judgment normal        Sherlyn Hunter PA-C

## 2023-09-25 ENCOUNTER — HOSPITAL ENCOUNTER (EMERGENCY)
Facility: HOSPITAL | Age: 33
Discharge: HOME | End: 2023-09-26
Attending: EMERGENCY MEDICINE
Payer: COMMERCIAL

## 2023-09-25 ENCOUNTER — APPOINTMENT (EMERGENCY)
Dept: RADIOLOGY | Facility: HOSPITAL | Age: 33
End: 2023-09-25
Payer: COMMERCIAL

## 2023-09-25 DIAGNOSIS — R07.9 CHEST PAIN, UNSPECIFIED TYPE: ICD-10-CM

## 2023-09-25 DIAGNOSIS — R55 SYNCOPE: Primary | ICD-10-CM

## 2023-09-25 DIAGNOSIS — R00.1 BRADYCARDIA: ICD-10-CM

## 2023-09-25 PROCEDURE — 99285 EMERGENCY DEPT VISIT HI MDM: CPT | Performed by: EMERGENCY MEDICINE

## 2023-09-25 PROCEDURE — 71045 X-RAY EXAM CHEST 1 VIEW: CPT

## 2023-09-25 PROCEDURE — 36415 COLL VENOUS BLD VENIPUNCTURE: CPT | Performed by: EMERGENCY MEDICINE

## 2023-09-25 PROCEDURE — 85007 BL SMEAR W/DIFF WBC COUNT: CPT | Performed by: EMERGENCY MEDICINE

## 2023-09-25 PROCEDURE — 84484 ASSAY OF TROPONIN QUANT: CPT | Performed by: EMERGENCY MEDICINE

## 2023-09-25 PROCEDURE — 85027 COMPLETE CBC AUTOMATED: CPT | Performed by: EMERGENCY MEDICINE

## 2023-09-25 PROCEDURE — 99285 EMERGENCY DEPT VISIT HI MDM: CPT

## 2023-09-25 PROCEDURE — 93005 ELECTROCARDIOGRAM TRACING: CPT

## 2023-09-25 PROCEDURE — 80053 COMPREHEN METABOLIC PANEL: CPT | Performed by: EMERGENCY MEDICINE

## 2023-09-26 VITALS
BODY MASS INDEX: 25.26 KG/M2 | SYSTOLIC BLOOD PRESSURE: 115 MMHG | RESPIRATION RATE: 18 BRPM | OXYGEN SATURATION: 95 % | HEART RATE: 63 BPM | DIASTOLIC BLOOD PRESSURE: 57 MMHG | WEIGHT: 156.53 LBS | TEMPERATURE: 98 F

## 2023-09-26 LAB
2HR DELTA HS TROPONIN: 0 NG/L
ALBUMIN SERPL BCP-MCNC: 4.3 G/DL (ref 3.5–5)
ALP SERPL-CCNC: 70 U/L (ref 34–104)
ALT SERPL W P-5'-P-CCNC: 11 U/L (ref 7–52)
ANION GAP SERPL CALCULATED.3IONS-SCNC: 8 MMOL/L
ANISOCYTOSIS BLD QL SMEAR: PRESENT
AST SERPL W P-5'-P-CCNC: 17 U/L (ref 13–39)
ATRIAL RATE: 50 BPM
BASOPHILS # BLD MANUAL: 0 THOUSAND/UL (ref 0–0.1)
BASOPHILS NFR MAR MANUAL: 0 % (ref 0–1)
BILIRUB SERPL-MCNC: 0.51 MG/DL (ref 0.2–1)
BUN SERPL-MCNC: 18 MG/DL (ref 5–25)
CALCIUM SERPL-MCNC: 9.6 MG/DL (ref 8.4–10.2)
CARDIAC TROPONIN I PNL SERPL HS: 3 NG/L
CARDIAC TROPONIN I PNL SERPL HS: 3 NG/L
CHLORIDE SERPL-SCNC: 106 MMOL/L (ref 96–108)
CO2 SERPL-SCNC: 27 MMOL/L (ref 21–32)
CREAT SERPL-MCNC: 1.12 MG/DL (ref 0.6–1.3)
EOSINOPHIL # BLD MANUAL: 0.58 THOUSAND/UL (ref 0–0.4)
EOSINOPHIL NFR BLD MANUAL: 5 % (ref 0–6)
ERYTHROCYTE [DISTWIDTH] IN BLOOD BY AUTOMATED COUNT: 11.8 % (ref 11.6–15.1)
GFR SERPL CREATININE-BSD FRML MDRD: 84 ML/MIN/1.73SQ M
GLUCOSE SERPL-MCNC: 93 MG/DL (ref 65–140)
HCT VFR BLD AUTO: 41.2 % (ref 36.5–49.3)
HGB BLD-MCNC: 13.7 G/DL (ref 12–17)
LG PLATELETS BLD QL SMEAR: PRESENT
LYMPHOCYTES # BLD AUTO: 48 % (ref 14–44)
LYMPHOCYTES # BLD AUTO: 5.85 THOUSAND/UL (ref 0.6–4.47)
MCH RBC QN AUTO: 31.7 PG (ref 26.8–34.3)
MCHC RBC AUTO-ENTMCNC: 33.3 G/DL (ref 31.4–37.4)
MCV RBC AUTO: 95 FL (ref 82–98)
MONOCYTES # BLD AUTO: 0.7 THOUSAND/UL (ref 0–1.22)
MONOCYTES NFR BLD: 6 % (ref 4–12)
NEUTROPHILS # BLD MANUAL: 4.56 THOUSAND/UL (ref 1.85–7.62)
NEUTS SEG NFR BLD AUTO: 39 % (ref 43–75)
P AXIS: 49 DEGREES
PLATELET # BLD AUTO: 249 THOUSANDS/UL (ref 149–390)
PLATELET BLD QL SMEAR: ADEQUATE
PMV BLD AUTO: 8.7 FL (ref 8.9–12.7)
POLYCHROMASIA BLD QL SMEAR: PRESENT
POTASSIUM SERPL-SCNC: 3.8 MMOL/L (ref 3.5–5.3)
PR INTERVAL: 136 MS
PROT SERPL-MCNC: 6.4 G/DL (ref 6.4–8.4)
QRS AXIS: 77 DEGREES
QRSD INTERVAL: 92 MS
QT INTERVAL: 438 MS
QTC INTERVAL: 399 MS
RBC # BLD AUTO: 4.32 MILLION/UL (ref 3.88–5.62)
SMUDGE CELLS BLD QL SMEAR: PRESENT
SODIUM SERPL-SCNC: 141 MMOL/L (ref 135–147)
STOMATOCYTES BLD QL SMEAR: PRESENT
T WAVE AXIS: 43 DEGREES
VARIANT LYMPHS # BLD AUTO: 2 %
VENTRICULAR RATE: 50 BPM
WBC # BLD AUTO: 11.69 THOUSAND/UL (ref 4.31–10.16)

## 2023-09-26 PROCEDURE — 93010 ELECTROCARDIOGRAM REPORT: CPT | Performed by: INTERNAL MEDICINE

## 2023-09-26 PROCEDURE — 96374 THER/PROPH/DIAG INJ IV PUSH: CPT

## 2023-09-26 PROCEDURE — 36415 COLL VENOUS BLD VENIPUNCTURE: CPT | Performed by: EMERGENCY MEDICINE

## 2023-09-26 PROCEDURE — 96361 HYDRATE IV INFUSION ADD-ON: CPT

## 2023-09-26 PROCEDURE — 84484 ASSAY OF TROPONIN QUANT: CPT | Performed by: EMERGENCY MEDICINE

## 2023-09-26 RX ORDER — KETOROLAC TROMETHAMINE 30 MG/ML
15 INJECTION, SOLUTION INTRAMUSCULAR; INTRAVENOUS ONCE
Status: DISCONTINUED | OUTPATIENT
Start: 2023-09-26 | End: 2023-09-26

## 2023-09-26 RX ORDER — KETOROLAC TROMETHAMINE 30 MG/ML
15 INJECTION, SOLUTION INTRAMUSCULAR; INTRAVENOUS ONCE
Status: COMPLETED | OUTPATIENT
Start: 2023-09-26 | End: 2023-09-26

## 2023-09-26 RX ADMIN — SODIUM CHLORIDE 1000 ML: 0.9 INJECTION, SOLUTION INTRAVENOUS at 00:20

## 2023-09-26 RX ADMIN — KETOROLAC TROMETHAMINE 15 MG: 30 INJECTION, SOLUTION INTRAMUSCULAR; INTRAVENOUS at 01:00

## 2023-09-26 NOTE — DISCHARGE INSTRUCTIONS
You have been seen for evaluation after an episode of passing out. Please stay hydrated. Return to the emergency department if you develop worsening chest pain, trouble breathing, lightheadedness or any other symptoms of concern. Please follow up with your PCP and cardiology by calling the number provided.

## 2023-09-26 NOTE — ED PROVIDER NOTES
History  Chief Complaint   Patient presents with   • Chest Pain     Chest pain everynight. States syncope as well. Vomits with syncope     Lynn Rodríguez is a 26y.o. year old male with PMH of syncope presenting to the [facility] ED for evaluation after a syncopal episode. Patient was sitting in the emergency department with his significant other when he became lightheaded and passed out. He was unresponsive for several seconds and then had brisk return to baseline mental status. Patient had reporting intermittent chest discomfort throughout the day today. No reported fevers, cough, dyspnea. No nausea/vomiting/abdominal pain prior to episode this evening. Patient has history of syncope previously. No leg pain/swelling. Patient does not take any medications daily. History provided by:  Medical records and patient   used: No    Chest Pain  Associated symptoms: nausea    Associated symptoms: no abdominal pain, no back pain, no cough, no fever, no shortness of breath and not vomiting        Prior to Admission Medications   Prescriptions Last Dose Informant Patient Reported? Taking? Multiple Vitamin (MULTI VITAMIN DAILY PO)   Yes No   Sig: Take by mouth      Facility-Administered Medications: None       Past Medical History:   Diagnosis Date   • Kidney stones    • Syncope and collapse        Past Surgical History:   Procedure Laterality Date   • SD CYSTO/URETERO W/LITHOTRIPSY &INDWELL STENT INSRT Left 11/29/2017    Procedure: CYSTOSCOPY URETEROSCOPY WITH LITHOTRIPSY HOLMIUM LASER, RETROGRADE PYELOGRAM AND INSERTION STENT URETERAL;  Surgeon: Evonnie Ahumada, MD;  Location: [facility];  Service: Urology       History reviewed. No pertinent family history. I have reviewed and agree with the history as documented.     E-Cigarette/Vaping   • E-Cigarette Use Never User      E-Cigarette/Vaping Substances     Social History     Tobacco Use   • Smoking status: Every Day     Packs/day: 0.25 Years: 6.00     Total pack years: 1.50     Types: Cigarettes   • Smokeless tobacco: Never   Vaping Use   • Vaping Use: Never used   Substance Use Topics   • Alcohol use: Yes     Comment: socially   • Drug use: Yes     Types: Marijuana       Review of Systems   Constitutional: Negative for chills and fever. Respiratory: Negative for cough and shortness of breath. Cardiovascular: Positive for chest pain. Gastrointestinal: Positive for nausea. Negative for abdominal pain, diarrhea and vomiting. Genitourinary: Negative for dysuria and flank pain. Musculoskeletal: Negative for arthralgias and back pain. Neurological: Positive for syncope. All other systems reviewed and are negative. Physical Exam  Physical Exam  Vitals and nursing note reviewed. Constitutional:       General: He is not in acute distress. Appearance: Normal appearance. He is well-developed. He is not ill-appearing, toxic-appearing or diaphoretic. HENT:      Head: Normocephalic and atraumatic. Nose: No congestion or rhinorrhea. Eyes:      General:         Right eye: No discharge. Left eye: No discharge. Cardiovascular:      Rate and Rhythm: Regular rhythm. Bradycardia present. Pulmonary:      Effort: Pulmonary effort is normal. No respiratory distress. Breath sounds: Normal breath sounds. No wheezing or rales. Abdominal:      General: There is no distension. Palpations: Abdomen is soft. Tenderness: There is no abdominal tenderness. There is no right CVA tenderness, left CVA tenderness, guarding or rebound. Musculoskeletal:      Cervical back: Normal range of motion. No rigidity. Right lower leg: No edema. Left lower leg: No edema. Skin:     General: Skin is warm. Capillary Refill: Capillary refill takes less than 2 seconds. Neurological:      Mental Status: He is alert and oriented to person, place, and time.    Psychiatric:         Mood and Affect: Mood normal. Behavior: Behavior normal.         Vital Signs  ED Triage Vitals [09/25/23 2327]   Temperature Pulse Respirations Blood Pressure SpO2   98 °F (36.7 °C) (!) 48 18 106/55 98 %      Temp src Heart Rate Source Patient Position - Orthostatic VS BP Location FiO2 (%)   -- Monitor Lying Left arm --      Pain Score       --           Vitals:    09/25/23 2327 09/26/23 0200 09/26/23 0207   BP: 106/55 115/57    Pulse: (!) 48 (!) 50 63   Patient Position - Orthostatic VS: Lying           Visual Acuity      ED Medications  Medications   sodium chloride 0.9 % bolus 1,000 mL (0 mL Intravenous Stopped 9/26/23 0102)   ketorolac (TORADOL) injection 15 mg (15 mg Intravenous Given 9/26/23 0100)       Diagnostic Studies  Results Reviewed     Procedure Component Value Units Date/Time    HS Troponin I 2hr [[id number]]  (Normal) Collected: 09/26/23 0111    Lab Status: Final result Specimen: Blood from Arm, Left Updated: 09/26/23 0142     hs TnI 2hr 3 ng/L      Delta 2hr hsTnI 0 ng/L     RBC Morphology Reflex Test [[id number]] Collected: 09/25/23 2329    Lab Status: Final result Specimen: Blood from Arm, Left Updated: 09/26/23 0102    HS Troponin 0hr (reflex protocol) [[id number]]  (Normal) Collected: 09/25/23 2329    Lab Status: Final result Specimen: Blood from Arm, Left Updated: 09/26/23 0005     hs TnI 0hr 3 ng/L     CBC and differential [[id number]]  (Abnormal) Collected: 09/25/23 2329    Lab Status: Final result Specimen: Blood from Arm, Left Updated: 09/26/23 0005     WBC 11.69 Thousand/uL      RBC 4.32 Million/uL      Hemoglobin 13.7 g/dL      Hematocrit 41.2 %      MCV 95 fL      MCH 31.7 pg      MCHC 33.3 g/dL      RDW 11.8 %      MPV 8.7 fL      Platelets 335 Thousands/uL     Narrative: This is an appended report. These results have been appended to a previously verified report.     Manual Differential(PHLEBS Do Not Order) [[id number]]  (Abnormal) Collected: 09/25/23 2329    Lab Status: Final result Specimen: Blood from Arm, Left Updated: 09/26/23 0005     Segmented % 39 %      Lymphocytes % 48 %      Monocytes % 6 %      Eosinophils, % 5 %      Basophils % 0 %      Atypical Lymphocytes % 2 %      Absolute Neutrophils 4.56 Thousand/uL      Lymphocytes Absolute 5.85 Thousand/uL      Monocytes Absolute 0.70 Thousand/uL      Eosinophils Absolute 0.58 Thousand/uL      Basophils Absolute 0.00 Thousand/uL      Total Counted --     Smudge Cells Present     Platelet Estimate Adequate     Large Platelet Present     Anisocytosis Present     Polychromasia Present     Stomatocytes Present    Comprehensive metabolic panel [[id number]] Collected: 09/25/23 2329    Lab Status: Final result Specimen: Blood from Arm, Left Updated: 09/26/23 0001     Sodium 141 mmol/L      Potassium 3.8 mmol/L      Chloride 106 mmol/L      CO2 27 mmol/L      ANION GAP 8 mmol/L      BUN 18 mg/dL      Creatinine 1.12 mg/dL      Glucose 93 mg/dL      Calcium 9.6 mg/dL      AST 17 U/L      ALT 11 U/L      Alkaline Phosphatase 70 U/L      Total Protein 6.4 g/dL      Albumin 4.3 g/dL      Total Bilirubin 0.51 mg/dL      eGFR 84 ml/min/1.73sq m     Narrative:      [place] guidelines for Chronic Kidney Disease (CKD):   •  Stage 1 with normal or high GFR (GFR > 90 mL/min/1.73 square meters)  •  Stage 2 Mild CKD (GFR = 60-89 mL/min/1.73 square meters)  •  Stage 3A Moderate CKD (GFR = 45-59 mL/min/1.73 square meters)  •  Stage 3B Moderate CKD (GFR = 30-44 mL/min/1.73 square meters)  •  Stage 4 Severe CKD (GFR = 15-29 mL/min/1.73 square meters)  •  Stage 5 End Stage CKD (GFR <15 mL/min/1.73 square meters)  Note: GFR calculation is accurate only with a steady state creatinine                 XR chest portable   ED Interpretation by Dayron Schilling DO (09/25 2611)   No acute cardiopulmonary disease. Final Result by Jean-Claude Gordon MD (09/26 3981)      No acute cardiopulmonary disease.                   Workstation performed: [id number] Procedures  Procedures         ED Course  ED Course as of 09/27/23 0050   Mon Sep 25, 2023   2330 Procedure Note: EKG  Date/Time: 09/25/23 11:30 PM   Interpreted by: Tee Krishna DO  Indications / Diagnosis: syncope  ECG reviewed by me, the ED Provider: yes   The EKG demonstrates:  Rhythm: sinus bradycardia, rate= 50 BPM  Intervals: Normal DE and QT intervals  Axis: Normal axis  QRS/Blocks: Normal QRS  ST Changes: No acute ST/T waves changes. No CELESTINO. No TWI. Tue Sep 26, 2023   0203 Patient reassessed. No pain at this time. Denies any symptoms. Vitals including heart rate have improved. Patient offered admission for telemetry monitoring however declines. Emphasized need for outpatient cardiology evaluation. Will discharge at this time. HEART Risk Score    Flowsheet Row Most Recent Value   Heart Score Risk Calculator    History 0 Filed at: 09/26/2023 0157   ECG 0 Filed at: 09/26/2023 0157   Age 0 Filed at: 09/26/2023 0157   Risk Factors 1 Filed at: 09/26/2023 0157   Troponin 0 Filed at: 09/26/2023 0157   HEART Score 1 Filed at: 09/26/2023 0157                        SBIRT 20yo+    Flowsheet Row Most Recent Value   Initial Alcohol Screen: US AUDIT-C     1. How often do you have a drink containing alcohol? 0 Filed at: 09/25/2023 2328   Audit-C Score 0 Filed at: 09/25/2023 2328                    Medical Decision Making    26 y.o. male presenting for evaluation after a syncopal episode. Patient with brief witnessed episode this evening. He has multiple prior episodes of syncope previously. Bradycardia noted on arrival. Symptoms maybe vasovaal in etiology. Will order CBC, CMP, troponin, EKG, CXR to evaluate for arrhythmia, heart block, electrolyte abnormality, ACS, PTX, pulmonary disease, widened mediastinum. Given normal oxygen saturation and absence of dyspnea in setting of bradycardia I do not suspect PE.   Given age and absence of cardiac risk factors do not suspect aortic dissection. Reassessment: Chest pain resolved during ED course. Heart rate improved and patient acting appropriately. Reviewed lab, chest x-ray and EKG results. Disposition: Given syncope and episodes of bradycardia I offered admission for observation and telemetry monitoring. The patient declines and prefers outpatient follow-up. I emphasized the need for further evaluation by cardiology given episodes of bradycardia and syncope. Discharge Plan: RTED precautions emphasized. The patient was provided a written after visit summary with strict RTED precautions. Followup: I have discussed with the patient plan to follow up with a PCP and cardiology. Contact information provided in AVS.    Amount and/or Complexity of Data Reviewed  Labs: ordered. Radiology: ordered and independent interpretation performed. Risk  Prescription drug management. Disposition  Final diagnoses:   Syncope   Bradycardia   Chest pain, unspecified type     Time reflects when diagnosis was documented in both MDM as applicable and the Disposition within this note     Time User Action Codes Description Comment    9/26/2023  2:05 AM [employer] Partida Add [R55] Syncope     9/26/2023  2:05 AM Benjaman Partida Add [R00.1] Bradycardia     9/26/2023  2:07 AM Benjaman Partida Add [R07.9] Chest pain, unspecified type       ED Disposition     ED Disposition   Discharge    Condition   Stable    Date/Time   Tue Sep 26, 2023  2:05 AM    Comment   [address] discharge to home/self care.                Follow-up Information     Follow up With Specialties Details Why Contact Info Additional [address] Cardiology [facility]iology Schedule an appointment as soon as possible for a visit  To make appointment for reevaluation ASA. [address] [facility] [phone] 0727 [facility] [address], [phone] [facility],First Floor Family Medicine Schedule an appointment as soon as possible for a visit  To make appointment for reevaluation in 3-5 days.  [address] [id number]  [address] [place] [address], [address] [place] [address], [address] [facility]          Discharge Medication List as of 9/26/2023  2:07 AM      CONTINUE these medications which have NOT CHANGED    Details   Multiple Vitamin (MULTI VITAMIN DAILY PO) Take by mouth, Historical Med                 PDMP Review     None          ED Provider  Electronically Signed by           Sudheer Villanueva DO  09/27/23 0050

## 2024-07-11 ENCOUNTER — APPOINTMENT (EMERGENCY)
Dept: CT IMAGING | Facility: HOSPITAL | Age: 34
End: 2024-07-11
Payer: COMMERCIAL

## 2024-07-11 ENCOUNTER — APPOINTMENT (EMERGENCY)
Dept: ULTRASOUND IMAGING | Facility: HOSPITAL | Age: 34
End: 2024-07-11
Payer: COMMERCIAL

## 2024-07-11 ENCOUNTER — APPOINTMENT (EMERGENCY)
Dept: RADIOLOGY | Facility: HOSPITAL | Age: 34
End: 2024-07-11
Payer: COMMERCIAL

## 2024-07-11 ENCOUNTER — HOSPITAL ENCOUNTER (EMERGENCY)
Facility: HOSPITAL | Age: 34
Discharge: HOME | End: 2024-07-11
Attending: EMERGENCY MEDICINE
Payer: COMMERCIAL

## 2024-07-11 VITALS
SYSTOLIC BLOOD PRESSURE: 115 MMHG | WEIGHT: 156 LBS | HEART RATE: 60 BPM | OXYGEN SATURATION: 98 % | RESPIRATION RATE: 16 BRPM | BODY MASS INDEX: 25.07 KG/M2 | TEMPERATURE: 96.9 F | DIASTOLIC BLOOD PRESSURE: 71 MMHG | HEIGHT: 66 IN

## 2024-07-11 DIAGNOSIS — N41.9 PROSTATITIS: Primary | ICD-10-CM

## 2024-07-11 LAB
2HR DELTA HS TROPONIN: >1 NG/L
ALBUMIN SERPL BCG-MCNC: 4.5 G/DL (ref 3.5–5)
ALP SERPL-CCNC: 69 U/L (ref 34–104)
ALT SERPL W P-5'-P-CCNC: 12 U/L (ref 7–52)
ANION GAP SERPL CALCULATED.3IONS-SCNC: 9 MMOL/L (ref 4–13)
AST SERPL W P-5'-P-CCNC: 16 U/L (ref 13–39)
BACTERIA UR QL AUTO: NORMAL /HPF
BASOPHILS # BLD AUTO: 0.02 THOUSANDS/ÂΜL (ref 0–0.1)
BASOPHILS NFR BLD AUTO: 0 % (ref 0–1)
BILIRUB SERPL-MCNC: 0.75 MG/DL (ref 0.2–1)
BILIRUB UR QL STRIP: NEGATIVE
BUN SERPL-MCNC: 15 MG/DL (ref 5–25)
CALCIUM SERPL-MCNC: 10 MG/DL (ref 8.4–10.2)
CARDIAC TROPONIN I PNL SERPL HS: 3 NG/L
CARDIAC TROPONIN I PNL SERPL HS: <2 NG/L
CHLORIDE SERPL-SCNC: 104 MMOL/L (ref 96–108)
CLARITY UR: CLEAR
CO2 SERPL-SCNC: 26 MMOL/L (ref 21–32)
COLOR UR: YELLOW
CREAT SERPL-MCNC: 1.11 MG/DL (ref 0.6–1.3)
D DIMER PPP FEU-MCNC: 0.31 UG/ML FEU
EOSINOPHIL # BLD AUTO: 0.1 THOUSAND/ÂΜL (ref 0–0.61)
EOSINOPHIL NFR BLD AUTO: 2 % (ref 0–6)
ERYTHROCYTE [DISTWIDTH] IN BLOOD BY AUTOMATED COUNT: 11.8 % (ref 11.6–15.1)
FLUAV RNA RESP QL NAA+PROBE: NEGATIVE
FLUBV RNA RESP QL NAA+PROBE: NEGATIVE
GFR SERPL CREATININE-BSD FRML MDRD: 85 ML/MIN/1.73SQ M
GLUCOSE SERPL-MCNC: 89 MG/DL (ref 65–140)
GLUCOSE UR STRIP-MCNC: NEGATIVE MG/DL
HCT VFR BLD AUTO: 43.9 % (ref 36.5–49.3)
HGB BLD-MCNC: 14.4 G/DL (ref 12–17)
HGB UR QL STRIP.AUTO: NEGATIVE
IMM GRANULOCYTES # BLD AUTO: 0.01 THOUSAND/UL (ref 0–0.2)
IMM GRANULOCYTES NFR BLD AUTO: 0 % (ref 0–2)
KETONES UR STRIP-MCNC: NEGATIVE MG/DL
LACTATE SERPL-SCNC: 0.9 MMOL/L (ref 0.5–2)
LACTATE SERPL-SCNC: 2.3 MMOL/L (ref 0.5–2)
LEUKOCYTE ESTERASE UR QL STRIP: ABNORMAL
LIPASE SERPL-CCNC: 25 U/L (ref 11–82)
LYMPHOCYTES # BLD AUTO: 2.63 THOUSANDS/ÂΜL (ref 0.6–4.47)
LYMPHOCYTES NFR BLD AUTO: 39 % (ref 14–44)
MAGNESIUM SERPL-MCNC: 1.9 MG/DL (ref 1.9–2.7)
MCH RBC QN AUTO: 30.8 PG (ref 26.8–34.3)
MCHC RBC AUTO-ENTMCNC: 32.8 G/DL (ref 31.4–37.4)
MCV RBC AUTO: 94 FL (ref 82–98)
MONOCYTES # BLD AUTO: 0.81 THOUSAND/ÂΜL (ref 0.17–1.22)
MONOCYTES NFR BLD AUTO: 12 % (ref 4–12)
NEUTROPHILS # BLD AUTO: 3.24 THOUSANDS/ÂΜL (ref 1.85–7.62)
NEUTS SEG NFR BLD AUTO: 47 % (ref 43–75)
NITRITE UR QL STRIP: NEGATIVE
NON-SQ EPI CELLS URNS QL MICRO: NORMAL /HPF
NRBC BLD AUTO-RTO: 0 /100 WBCS
PH UR STRIP.AUTO: 7 [PH]
PLATELET # BLD AUTO: 223 THOUSANDS/UL (ref 149–390)
PMV BLD AUTO: 8.9 FL (ref 8.9–12.7)
POTASSIUM SERPL-SCNC: 4.4 MMOL/L (ref 3.5–5.3)
PROT SERPL-MCNC: 7.4 G/DL (ref 6.4–8.4)
PROT UR STRIP-MCNC: NEGATIVE MG/DL
RBC # BLD AUTO: 4.68 MILLION/UL (ref 3.88–5.62)
RBC #/AREA URNS AUTO: NORMAL /HPF
RSV RNA RESP QL NAA+PROBE: NEGATIVE
SARS-COV-2 RNA RESP QL NAA+PROBE: NEGATIVE
SODIUM SERPL-SCNC: 139 MMOL/L (ref 135–147)
SP GR UR STRIP.AUTO: 1.02 (ref 1–1.03)
TSH SERPL DL<=0.05 MIU/L-ACNC: 3.85 UIU/ML (ref 0.45–4.5)
UROBILINOGEN UR STRIP-ACNC: <2 MG/DL
WBC # BLD AUTO: 6.81 THOUSAND/UL (ref 4.31–10.16)
WBC #/AREA URNS AUTO: NORMAL /HPF

## 2024-07-11 PROCEDURE — 85379 FIBRIN DEGRADATION QUANT: CPT | Performed by: EMERGENCY MEDICINE

## 2024-07-11 PROCEDURE — 76870 US EXAM SCROTUM: CPT

## 2024-07-11 PROCEDURE — 0241U HB NFCT DS VIR RESP RNA 4 TRGT: CPT | Performed by: EMERGENCY MEDICINE

## 2024-07-11 PROCEDURE — 51798 US URINE CAPACITY MEASURE: CPT

## 2024-07-11 PROCEDURE — 83690 ASSAY OF LIPASE: CPT | Performed by: EMERGENCY MEDICINE

## 2024-07-11 PROCEDURE — 83605 ASSAY OF LACTIC ACID: CPT | Performed by: EMERGENCY MEDICINE

## 2024-07-11 PROCEDURE — 96366 THER/PROPH/DIAG IV INF ADDON: CPT

## 2024-07-11 PROCEDURE — 83735 ASSAY OF MAGNESIUM: CPT | Performed by: EMERGENCY MEDICINE

## 2024-07-11 PROCEDURE — 96375 TX/PRO/DX INJ NEW DRUG ADDON: CPT

## 2024-07-11 PROCEDURE — 93005 ELECTROCARDIOGRAM TRACING: CPT

## 2024-07-11 PROCEDURE — 87086 URINE CULTURE/COLONY COUNT: CPT | Performed by: EMERGENCY MEDICINE

## 2024-07-11 PROCEDURE — 99285 EMERGENCY DEPT VISIT HI MDM: CPT | Performed by: EMERGENCY MEDICINE

## 2024-07-11 PROCEDURE — 96365 THER/PROPH/DIAG IV INF INIT: CPT

## 2024-07-11 PROCEDURE — 74176 CT ABD & PELVIS W/O CONTRAST: CPT

## 2024-07-11 PROCEDURE — 87491 CHLMYD TRACH DNA AMP PROBE: CPT | Performed by: EMERGENCY MEDICINE

## 2024-07-11 PROCEDURE — 84484 ASSAY OF TROPONIN QUANT: CPT | Performed by: EMERGENCY MEDICINE

## 2024-07-11 PROCEDURE — 36415 COLL VENOUS BLD VENIPUNCTURE: CPT | Performed by: EMERGENCY MEDICINE

## 2024-07-11 PROCEDURE — 87591 N.GONORRHOEAE DNA AMP PROB: CPT | Performed by: EMERGENCY MEDICINE

## 2024-07-11 PROCEDURE — 99284 EMERGENCY DEPT VISIT MOD MDM: CPT

## 2024-07-11 PROCEDURE — 84443 ASSAY THYROID STIM HORMONE: CPT | Performed by: EMERGENCY MEDICINE

## 2024-07-11 PROCEDURE — 71045 X-RAY EXAM CHEST 1 VIEW: CPT

## 2024-07-11 PROCEDURE — 81001 URINALYSIS AUTO W/SCOPE: CPT | Performed by: EMERGENCY MEDICINE

## 2024-07-11 PROCEDURE — 85025 COMPLETE CBC W/AUTO DIFF WBC: CPT | Performed by: EMERGENCY MEDICINE

## 2024-07-11 PROCEDURE — 96367 TX/PROPH/DG ADDL SEQ IV INF: CPT

## 2024-07-11 PROCEDURE — 80053 COMPREHEN METABOLIC PANEL: CPT | Performed by: EMERGENCY MEDICINE

## 2024-07-11 RX ORDER — DOXYCYCLINE HYCLATE 100 MG/1
100 CAPSULE ORAL ONCE
Status: COMPLETED | OUTPATIENT
Start: 2024-07-11 | End: 2024-07-11

## 2024-07-11 RX ORDER — CEFTRIAXONE 1 G/50ML
1000 INJECTION, SOLUTION INTRAVENOUS ONCE
Status: COMPLETED | OUTPATIENT
Start: 2024-07-11 | End: 2024-07-11

## 2024-07-11 RX ORDER — DOXYCYCLINE HYCLATE 100 MG/1
100 CAPSULE ORAL 2 TIMES DAILY
Qty: 14 CAPSULE | Refills: 0 | Status: SHIPPED | OUTPATIENT
Start: 2024-07-11 | End: 2024-07-25

## 2024-07-11 RX ORDER — ONDANSETRON 2 MG/ML
4 INJECTION INTRAMUSCULAR; INTRAVENOUS ONCE
Status: COMPLETED | OUTPATIENT
Start: 2024-07-11 | End: 2024-07-11

## 2024-07-11 RX ORDER — ONDANSETRON 4 MG/1
4 TABLET, ORALLY DISINTEGRATING ORAL EVERY 8 HOURS PRN
Qty: 20 TABLET | Refills: 0 | Status: SHIPPED | OUTPATIENT
Start: 2024-07-11

## 2024-07-11 RX ORDER — KETOROLAC TROMETHAMINE 30 MG/ML
15 INJECTION, SOLUTION INTRAMUSCULAR; INTRAVENOUS ONCE
Status: COMPLETED | OUTPATIENT
Start: 2024-07-11 | End: 2024-07-11

## 2024-07-11 RX ORDER — NAPROXEN 500 MG/1
500 TABLET ORAL 2 TIMES DAILY WITH MEALS
Qty: 30 TABLET | Refills: 0 | Status: SHIPPED | OUTPATIENT
Start: 2024-07-11

## 2024-07-11 RX ADMIN — DOXYCYCLINE HYCLATE 100 MG: 100 CAPSULE ORAL at 15:22

## 2024-07-11 RX ADMIN — ONDANSETRON 4 MG: 2 INJECTION INTRAMUSCULAR; INTRAVENOUS at 12:48

## 2024-07-11 RX ADMIN — SODIUM CHLORIDE, SODIUM LACTATE, POTASSIUM CHLORIDE, AND CALCIUM CHLORIDE 1000 ML: .6; .31; .03; .02 INJECTION, SOLUTION INTRAVENOUS at 12:47

## 2024-07-11 RX ADMIN — KETOROLAC TROMETHAMINE 15 MG: 30 INJECTION, SOLUTION INTRAMUSCULAR at 12:48

## 2024-07-11 RX ADMIN — CEFTRIAXONE 1000 MG: 1 INJECTION, SOLUTION INTRAVENOUS at 15:22

## 2024-07-11 NOTE — ED PROVIDER NOTES
History  Chief Complaint   Patient presents with    Abdominal Pain     Patient reports three days of lower abdominal pain that is getting progressively worse. Family also reports that he passed out for about a minute and was sweating.      32 yo with prior history of syncope and kidney stones presents with 3-day history of lower quadrant abdominal pain today it seemed to worsen is mostly in the left lower quadrant and radiates to his penis he denies any penile discharge swelling sometimes he feels the discomfort in his left testicle he denies any scrotal edema or erythema.  He does have urinary frequency and urgency he has decreased urine output no yanet hematuria he denies any flank pain.  He denies any fever or chills but he has been hot he had sweats and route and his significant other states he briefly passed out for couple of seconds.  Patient while here developed some chest pain which is anterior he denies this to me he has no shortness of breath or pleuritic pain no history of falls or trauma.  He did develop some numbness to his right leg proximally no leg swelling no calf pain no prior history of DVT or PE.  No history of any weakness.  Medical history syncope kidney stones past surgical history cystoscopy with stent placement in 2017        Prior to Admission Medications   Prescriptions Last Dose Informant Patient Reported? Taking?   Multiple Vitamin (MULTI VITAMIN DAILY PO)   Yes No   Sig: Take by mouth      Facility-Administered Medications: None       Past Medical History:   Diagnosis Date    Kidney stones     Syncope and collapse        Past Surgical History:   Procedure Laterality Date    NH CYSTO/URETERO W/LITHOTRIPSY &INDWELL STENT INSRT Left 11/29/2017    Procedure: CYSTOSCOPY URETEROSCOPY WITH LITHOTRIPSY HOLMIUM LASER, RETROGRADE PYELOGRAM AND INSERTION STENT URETERAL;  Surgeon: Fredo Paris MD;  Location: [facility] OR;  Service: Urology       History reviewed. No pertinent family history.  I have  reviewed and agree with the history as documented.    E-Cigarette/Vaping    E-Cigarette Use Never User      E-Cigarette/Vaping Substances     Social History     Tobacco Use    Smoking status: Every Day     Current packs/day: 0.25     Average packs/day: 0.3 packs/day for 6.0 years (1.5 ttl pk-yrs)     Types: Cigarettes    Smokeless tobacco: Never   Vaping Use    Vaping status: Never Used   Substance Use Topics    Alcohol use: Yes     Comment: socially    Drug use: Yes     Types: Marijuana       Review of Systems   Constitutional:  Positive for activity change and diaphoresis. Negative for chills and fever.   HENT:  Negative for congestion, ear pain, rhinorrhea, sneezing, sore throat, trouble swallowing and voice change.    Eyes:  Negative for discharge and visual disturbance.   Respiratory:  Negative for cough and shortness of breath.    Cardiovascular:  Positive for chest pain. Negative for palpitations and leg swelling.   Gastrointestinal:  Positive for abdominal pain. Negative for blood in stool, diarrhea, nausea and vomiting.   Endocrine: Negative for polyuria.   Genitourinary:  Positive for decreased urine volume, difficulty urinating, frequency, penile pain, testicular pain (left) and urgency. Negative for dysuria, flank pain, penile discharge, penile swelling and scrotal swelling.   Musculoskeletal:  Negative for back pain and myalgias.   Skin:  Negative for rash.   Neurological:  Positive for syncope and numbness (rt thigh briefly). Negative for dizziness, weakness, light-headedness and headaches.   Hematological:  Negative for adenopathy.   Psychiatric/Behavioral:  Negative for confusion.    All other systems reviewed and are negative.      Physical Exam  Physical Exam  Vitals and nursing note reviewed. Exam conducted with a chaperone present.   Constitutional:       General: He is not in acute distress.     Appearance: He is well-developed. He is not diaphoretic.   HENT:      Head: Normocephalic and  atraumatic.      Right Ear: External ear normal.      Left Ear: External ear normal.      Nose: Nose normal. No congestion or rhinorrhea.      Mouth/Throat:      Mouth: Oropharynx is clear and moist. Mucous membranes are moist.   Eyes:      General: No scleral icterus.        Right eye: No discharge.         Left eye: No discharge.      Extraocular Movements: Extraocular movements intact and EOM normal.      Conjunctiva/sclera: Conjunctivae normal.      Pupils: Pupils are equal, round, and reactive to light.   Cardiovascular:      Rate and Rhythm: Regular rhythm. Bradycardia present.      Pulses: Normal pulses.      Heart sounds: Normal heart sounds.   Pulmonary:      Effort: Pulmonary effort is normal. No respiratory distress.      Breath sounds: Normal breath sounds. No stridor. No wheezing, rhonchi or rales.      Comments: Sats 100%  Abdominal:      General: Bowel sounds are normal. There is no distension.      Palpations: Abdomen is soft. There is no mass.      Tenderness: There is no abdominal tenderness. There is no right CVA tenderness, left CVA tenderness, guarding or rebound.      Comments: No reproducible abdm tenderness Back: no mildline T or L spine tenderness   Genitourinary:     Penis: Normal.       Testes: Normal.         Right: Swelling not present.         Left: Swelling not present.      Comments: Chaparoned by Cinthya  tech circumcised male there is no tenderness or deformity of the phallus.  No exudate with milking of the urethra.  There is no testicular tenderness or masses no abnormal lie to the testicles no evidence of hernia bilaterally.  Patient declines rectal examination  Musculoskeletal:         General: No tenderness, deformity or edema. Normal range of motion.      Cervical back: Normal range of motion and neck supple. No rigidity or tenderness.      Right lower leg: No edema.      Left lower leg: No edema.   Lymphadenopathy:      Cervical: No cervical adenopathy.   Skin:     General:  Skin is warm and dry.      Capillary Refill: Capillary refill takes less than 2 seconds.   Neurological:      General: No focal deficit present.      Mental Status: He is alert and oriented to person, place, and time. Mental status is at baseline.      Cranial Nerves: No cranial nerve deficit.      Sensory: No sensory deficit.      Motor: No weakness or abnormal muscle tone.      Coordination: Coordination normal.   Psychiatric:         Mood and Affect: Mood is anxious.         Vital Signs  ED Triage Vitals [07/11/24 1208]   Temperature Pulse Respirations Blood Pressure SpO2   (!) 96.9 °F (36.1 °C) 60 16 143/71 100 %      Temp Source Heart Rate Source Patient Position - Orthostatic VS BP Location FiO2 (%)   Temporal Monitor Sitting Right arm --      Pain Score       7           Vitals:    07/11/24 1208 07/11/24 1354 07/11/24 1555   BP: 143/71 121/69 115/71   Pulse: 60 56 60   Patient Position - Orthostatic VS: Sitting Lying Lying         Visual Acuity      ED Medications  Medications   lactated ringers bolus 1,000 mL (0 mL Intravenous Stopped 7/11/24 1430)   ondansetron (ZOFRAN) injection 4 mg (4 mg Intravenous Given 7/11/24 1248)   ketorolac (TORADOL) injection 15 mg (15 mg Intravenous Given 7/11/24 1248)   cefTRIAXone (ROCEPHIN) IVPB (premix in dextrose) 1,000 mg 50 mL (0 mg Intravenous Stopped 7/11/24 1555)   doxycycline hyclate (VIBRAMYCIN) capsule 100 mg (100 mg Oral Given 7/11/24 1522)       Diagnostic Studies  Results Reviewed       Procedure Component Value Units Date/Time    HS Troponin I 2hr [[id number]]  (Normal) Collected: 07/11/24 1527    Lab Status: Final result Specimen: Blood from Arm, Left Updated: 07/11/24 1601     hs TnI 2hr 3 ng/L      Delta 2hr hsTnI >1 ng/L     Lactic acid 2 Hours [[id number]]  (Normal) Collected: 07/11/24 1527    Lab Status: Final result Specimen: Blood from Arm, Left Updated: 07/11/24 1554     LACTIC ACID 0.9 mmol/L     Narrative:      Result may be elevated if tourniquet was  used during collection.    Chlamydia/GC amplified DNA by PCR [[id number]] Collected: 07/11/24 1255    Lab Status: In process Updated: 07/11/24 1532    Urine culture [[id number]] Collected: 07/11/24 1255    Lab Status: In process Specimen: Urine Updated: 07/11/24 1513    FLU/RSV/COVID - if FLU/RSV clinically relevant [[id number]]  (Normal) Collected: 07/11/24 1242    Lab Status: Final result Specimen: Nares from Nose Updated: 07/11/24 1327     SARS-CoV-2 Negative     INFLUENZA A PCR Negative     INFLUENZA B PCR Negative     RSV PCR Negative    Narrative:      FOR PEDIATRIC PATIENTS - copy/paste COVID Guidelines URL to browser: [email or web address]    SARS-CoV-2 assay is a Nucleic Acid Amplification assay intended for the  qualitative detection of nucleic acid from SARS-CoV-2 in nasopharyngeal  swabs. Results are for the presumptive identification of SARS-CoV-2 RNA.    Positive results are indicative of infection with SARS-CoV-2, the virus  causing COVID-19, but do not rule out bacterial infection or co-infection  with other viruses. Laboratories within the [place] and its  territories are required to report all positive results to the appropriate  public health authorities. Negative results do not preclude SARS-CoV-2  infection and should not be used as the sole basis for treatment or other  patient management decisions. Negative results must be combined with  clinical observations, patient history, and epidemiological information.  This test has not been FDA cleared or approved.    This test has been authorized by FDA under an Emergency Use Authorization  (EUA). This test is only authorized for the duration of time the  declaration that circumstances exist justifying the authorization of the  emergency use of an in vitro diagnostic tests for detection of SARS-CoV-2  virus and/or diagnosis of COVID-19 infection under section 564(b)(1) of  the Act, 21 U.S.C.  360bbb-3(b)(1), unless the authorization is terminated  or revoked sooner. The test has been validated but independent review by FDA  and CLIA is pending.    Test performed using [employer]: This RT-PCR assay targets N2,  a region unique to SARS-CoV-2. A conserved region in the E-gene was chosen  for pan-Sarbecovirus detection which includes SARS-CoV-2.    According to CMS-2020-01-R, this platform meets the definition of high-throughput technology.    TSH, 3rd generation with Free T4 reflex [[id number]]  (Normal) Collected: 07/11/24 1242    Lab Status: Final result Specimen: Blood from Arm, Left Updated: 07/11/24 1322     TSH 3RD GENERATON 3.855 uIU/mL     HS Troponin 0hr (reflex protocol) [[id number]]  (Normal) Collected: 07/11/24 1242    Lab Status: Final result Specimen: Blood from Arm, Left Updated: 07/11/24 1313     hs TnI 0hr <2 ng/L     Urine Microscopic [[id number]]  (Normal) Collected: 07/11/24 1255    Lab Status: Final result Specimen: Urine, Clean Catch Updated: 07/11/24 1311     RBC, UA 0-1 /hpf      WBC, UA None Seen /hpf      Epithelial Cells None Seen /hpf      Bacteria, UA None Seen /hpf     UA w Reflex to Microscopic w Reflex to Culture [[id number]]  (Abnormal) Collected: 07/11/24 1255    Lab Status: Final result Specimen: Urine, Clean Catch Updated: 07/11/24 1308     Color, UA Yellow     Clarity, UA Clear     Specific Gravity, UA 1.020     pH, UA 7.0     Leukocytes, UA Trace     Nitrite, UA Negative     Protein, UA Negative mg/dl      Glucose, UA Negative mg/dl      Ketones, UA Negative mg/dl      Urobilinogen, UA <2.0 mg/dl      Bilirubin, UA Negative     Occult Blood, UA Negative    Comprehensive metabolic panel [[id number]] Collected: 07/11/24 1242    Lab Status: Final result Specimen: Blood from Arm, Left Updated: 07/11/24 1308     Sodium 139 mmol/L      Potassium 4.4 mmol/L      Chloride 104 mmol/L      CO2 26 mmol/L      ANION GAP 9 mmol/L      BUN 15 mg/dL      Creatinine 1.11 mg/dL       Glucose 89 mg/dL      Calcium 10.0 mg/dL      AST 16 U/L      ALT 12 U/L      Alkaline Phosphatase 69 U/L      Total Protein 7.4 g/dL      Albumin 4.5 g/dL      Total Bilirubin 0.75 mg/dL      eGFR 85 ml/min/1.73sq m     Narrative:      National Kidney Disease Foundation guidelines for Chronic Kidney Disease (CKD):     Stage 1 with normal or high GFR (GFR > 90 mL/min/1.73 square meters)    Stage 2 Mild CKD (GFR = 60-89 mL/min/1.73 square meters)    Stage 3A Moderate CKD (GFR = 45-59 mL/min/1.73 square meters)    Stage 3B Moderate CKD (GFR = 30-44 mL/min/1.73 square meters)    Stage 4 Severe CKD (GFR = 15-29 mL/min/1.73 square meters)    Stage 5 End Stage CKD (GFR <15 mL/min/1.73 square meters)  Note: GFR calculation is accurate only with a steady state creatinine    Lipase [[id number]]  (Normal) Collected: 07/11/24 1242    Lab Status: Final result Specimen: Blood from Arm, Left Updated: 07/11/24 1308     Lipase 25 u/L     Magnesium [[id number]]  (Normal) Collected: 07/11/24 1242    Lab Status: Final result Specimen: Blood from Arm, Left Updated: 07/11/24 1308     Magnesium 1.9 mg/dL     Lactic acid, plasma (w/reflex if result > 2.0) [[id number]]  (Abnormal) Collected: 07/11/24 1242    Lab Status: Final result Specimen: Blood from Arm, Left Updated: 07/11/24 1305     LACTIC ACID 2.3 mmol/L     Narrative:      Result may be elevated if tourniquet was used during collection.    D-Dimer [[id number]]  (Normal) Collected: 07/11/24 1242    Lab Status: Final result Specimen: Blood from Arm, Left Updated: 07/11/24 1300     D-Dimer, Quant 0.31 ug/ml FEU     CBC and differential [[id number]] Collected: 07/11/24 1242    Lab Status: Final result Specimen: Blood from Arm, Left Updated: 07/11/24 1249     WBC 6.81 Thousand/uL      RBC 4.68 Million/uL      Hemoglobin 14.4 g/dL      Hematocrit 43.9 %      MCV 94 fL      MCH 30.8 pg      MCHC 32.8 g/dL      RDW 11.8 %      MPV 8.9 fL      Platelets 223 Thousands/uL      nRBC 0 /100 WBCs       Segmented % 47 %      Immature Grans % 0 %      Lymphocytes % 39 %      Monocytes % 12 %      Eosinophils Relative 2 %      Basophils Relative 0 %      Absolute Neutrophils 3.24 Thousands/µL      Absolute Immature Grans 0.01 Thousand/uL      Absolute Lymphocytes 2.63 Thousands/µL      Absolute Monocytes 0.81 Thousand/µL      Eosinophils Absolute 0.10 Thousand/µL      Basophils Absolute 0.02 Thousands/µL                    US scrotum and testicles   Final Result by Luis Hernandez MD (07/11 1601)      No evidence of testicular torsion.      Workstation performed: [id number]         CT renal stone study abdomen pelvis without contrast   Final Result by Ulisses Presley MD (07/11 8394)      No kidney stone. No hydronephrosis.   Mild nonspecific thickening of the urinary bladder wall. Clinical correlation for cystitis.   Small volume of pelvic fluid.         Workstation performed: [id number]         XR chest 1 view portable   ED Interpretation by Reba Conti MD (07/11 1498)   Per my independent interpretation. Radiologist to provide formal read. No acute process      Final Result by Tiffany Good MD (07/11 8197)      No acute cardiopulmonary disease.            Workstation performed: [id number]                    Procedures  ECG 12 Lead Documentation Only    Date/Time: 7/11/2024 12:20 PM    Performed by: Reba Conti MD  Authorized by: Reba Conti MD    Indications / Diagnosis:  Syncope  ECG reviewed by me, the ED Provider: yes    Patient location:  ED  Previous ECG:     Previous ECG:  Compared to current    Comparison ECG info:  11/4/18 0248; 9/25/23 1280    Similarity:  No change  Rate:     ECG rate:  54    ECG rate assessment: bradycardic    Rhythm:     Rhythm: sinus bradycardia    QRS:     QRS axis:  Normal  Comments:      ; no acute ischemic changes           ED Course  ED Course as of 07/12/24 0034   Thu Jul 11, 2024   1339 Ambulating back from CT with  steady gait   1500 Reviewed CT scan with patient and significant other secondary  exam unremarkable will check PVR and proceed with u/s of scrotum.  Clinical evidence of epididymitis no clinical evidence of torsion.  Culture of the urine.  Due to urinary complaints.  Reviewed may be consistent with a prostatitis.  Declines rectal exam cover with Rocephin and doxycycline recommend urology follow-up.   1610 Scrotal u/s negative for torsion or testicular abnormality; per RN post void residual is 38ml; will recommend 14 day course of doxycycline and urology followup recommend no intercourse until urology followup as STI might be cause patient verbalized understanding reviewed patient has been treated for GC/Chlamydia if he completes his 2 week course of doxycyline but may required other STI testing. Reviewed reasons for return and discharge instruction               HEART Risk Score      Flowsheet Row Most Recent Value   Heart Score Risk Calculator    History 1 Filed at: 07/11/2024 1229   ECG 1 Filed at: 07/11/2024 1229   Age 0 Filed at: 07/11/2024 1229   Risk Factors 1 Filed at: 07/11/2024 1229   Troponin 0 Filed at: 07/11/2024 1229   HEART Score 3 Filed at: 07/11/2024 1229                          SBIRT 20yo+      Flowsheet Row Most Recent Value   Initial Alcohol Screen: US AUDIT-C     1. How often do you have a drink containing alcohol? 0 Filed at: 07/11/2024 1209   2. How many drinks containing alcohol do you have on a typical day you are drinking?  0 Filed at: 07/11/2024 1209   3a. Male UNDER 65: How often do you have five or more drinks on one occasion? 0 Filed at: 07/11/2024 1209   3b. FEMALE Any Age, or MALE 65+: How often do you have 4 or more drinks on one occassion? 0 Filed at: 07/11/2024 1209   Audit-C Score 0 Filed at: 07/11/2024 1209   GUILLE: How many times in the past year have you...    Used an illegal drug or used a prescription medication for non-medical reasons? Never Filed at: 07/11/2024 1209                       Medical Decision Making  Mdm: 32 yo male with prior hx of syncope and kidney stones presents with 3 d hx of bilateral LQ pain; currently LLQ with pain radiating to left testicle and phallus high clinical index of suspicion for symptomatic nephrolithiasis. Will eval for diverticulitis, pancreatitis, appendicitis; secondary to syncope initiate hydration antiemetics and hydration check electrolytes serial trops and d-dimer. Plan on renal CT if neg will pursie  exam and u/s    Amount and/or Complexity of Data Reviewed  Labs: ordered.  Radiology: ordered and independent interpretation performed.    Risk  Prescription drug management.                 Disposition  Final diagnoses:   Prostatitis     Time reflects when diagnosis was documented in both MDM as applicable and the Disposition within this note       Time User Action Codes Description Comment    7/11/2024  4:12 PM Reba Conti [N41.9] Prostatitis           ED Disposition       ED Disposition   Discharge    Condition   Stable    Date/Time   Thu Jul 11, 2024 1612    Comment   Geovanni Strong discharge to home/self care.                   Follow-up Information       Follow up With Specialties Details Why Contact Info Additional Information    Najma Rock PA-C Physician Assistant  recheck of passing out spells and chest pain [address] [facility] [address]  [phone]       [facility] [place] [facility] Call in 1 day recheck of symptoms [address] [facility] [address]  [phone] [facility] [place], [facility] [address], [place], [address]   [phone]            Discharge Medication List as of 7/11/2024  4:24 PM        START taking these medications    Details   doxycycline hyclate (VIBRAMYCIN) 100 mg capsule Take 1 capsule (100 mg total) by mouth 2 (two) times a day for 14 days, Starting Thu 7/11/2024, Until Thu 7/25/2024, Normal      naproxen (Naprosyn)  500 mg tablet Take 1 tablet (500 mg total) by mouth 2 (two) times a day with meals, Starting Thu 7/11/2024, Normal      ondansetron (ZOFRAN-ODT) 4 mg disintegrating tablet Take 1 tablet (4 mg total) by mouth every 8 (eight) hours as needed for nausea or vomiting for up to 20 doses, Starting Thu 7/11/2024, Normal           CONTINUE these medications which have NOT CHANGED    Details   Multiple Vitamin (MULTI VITAMIN DAILY PO) Take by mouth, Historical Med                 PDMP Review       None            ED Provider  Electronically Signed by             Reba Conti MD  07/12/24 0034

## 2024-07-11 NOTE — DISCHARGE INSTRUCTIONS
Plenty of cleo  Naprosyn fill script twice daily with food OR Aleve 2 tabs twice daily with food OR ibuprofen 200-800mg every 8 hours with food as needed for pain   Zofran as needed for nausea  Tylenol 650mg every 6 hours as needed for pain, fever (max 3000mg in 24 hours)   Finish entire 2 weeks of doxycyline even if you feel better - wait 2 hours before eating dairy, calcium, magnesium, iron multivitamins because will stick to antibiotic and make antibiotic ineffective  No intercourse until recheck with urology  No driving heavy machinery use for 24 hours because of feeling lightheaded  Return with fever, throwing up worsening or change in pain or any new or worsening symptoms

## 2024-07-12 ENCOUNTER — TELEPHONE (OUTPATIENT)
Age: 34
End: 2024-07-12

## 2024-07-12 LAB
ATRIAL RATE: 54 BPM
BACTERIA UR CULT: NORMAL
C TRACH DNA SPEC QL NAA+PROBE: NEGATIVE
N GONORRHOEA DNA SPEC QL NAA+PROBE: NEGATIVE
P AXIS: 53 DEGREES
PR INTERVAL: 146 MS
QRS AXIS: 83 DEGREES
QRSD INTERVAL: 92 MS
QT INTERVAL: 434 MS
QTC INTERVAL: 411 MS
T WAVE AXIS: 74 DEGREES
VENTRICULAR RATE: 54 BPM

## 2024-07-12 PROCEDURE — 93010 ELECTROCARDIOGRAM REPORT: CPT | Performed by: INTERNAL MEDICINE

## 2024-07-12 NOTE — TELEPHONE ENCOUNTER
US scrotum and testicles and CT negative. Kidney function WNL. U/a negative. Urine culture/STI testing currently in process.

## 2024-07-12 NOTE — TELEPHONE ENCOUNTER
NP- ref from ER for prostatitis       Patient was seen in ER yesterday 7/12/24    US scrotum and testicles was done at ER visit     CT was also done at ER visit     Patient was placed on antibiotics that he has not been able to  yet.     Patient was scheduled for soonest available on 8/28/24 with Miko Zambrano in [place].    Please advise on if time frame is appropriate.

## 2024-07-24 ENCOUNTER — TELEPHONE (OUTPATIENT)
Age: 34
End: 2024-07-24

## 2024-07-24 NOTE — TELEPHONE ENCOUNTER
Nancy from [place] [employer] called as they received the faxed signed copy of the Physicians certification form on (07/10/24 scanned in EPIC) but it is not entirely filled out.    Nancy stated as they do realize it is denied it still must be completed and then faxed back to [phone].    Towards bottom of form it states LENGTH OF CARE REQUIRED:  one of the areas must be checked off.  Either the Long Term or Short Term and then faxed back to them.

## 2024-07-26 NOTE — TELEPHONE ENCOUNTER
Received a call from [place] [employer]  for the patient. They are asking for the length of care portion of the Physician Certification form to be filled out to the best of the providers ability and to answer the question. If left blank it halts the process.     Best fax: [phone]    Please advise, thank you.

## 2025-02-04 ENCOUNTER — TELEPHONE (OUTPATIENT)
Age: 35
End: 2025-02-04

## 2025-02-04 ENCOUNTER — NURSE TRIAGE (OUTPATIENT)
Age: 35
End: 2025-02-04

## 2025-02-04 NOTE — TELEPHONE ENCOUNTER
"Called pt to gather further information on his s/s.  Pt states for the last 8 months he's been experiencing a poking sensation right above his heart.  States it is intermittent in intensity but doesn't fully go away.  When not as intense 3-4/10 at worst 6-7/10.  Pt states it has been going on for months and increasing in frequency,.    Pt admits to having s/s of SOB at times and his \"body will feel weak\" Pt state he also has passed out one time during one of these episodes when his spouse was in the ED and they then put him on the heart monitor and he was told his HR was really low.      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     Advised pt based on s/s and concerns, it is recommended he go to ED.  Pt states at this time he can't go as he has no transportation and no one to watch his 11 kids.  Pt states he'll try to go tomorrow.  Please review and advise how pt should proceed.    Reason for Disposition   Chest pain or 'angina' comes and goes and is happening more often (increasing in frequency) or getting worse (increasing in severity) (Exception: Chest pains that last only a few seconds.)    Answer Assessment - Initial Assessment Questions  1. LOCATION: \"Where does it hurt?\"        Right above his heart    2. RADIATION: \"Does the pain go anywhere else?\" (e.g., into neck, jaw, arms, back)      Goes to left shoulder and to neck.    3. ONSET: \"When did the chest pain begin?\" (Minutes, hours or days)       Has been occurring for the last 8 months;  has becoming more frequent.    4. PATTERN: \"Does the pain come and go, or has it been constant since it started?\"  \"Does it get worse with exertion?\"       Worsens with activity, constant but always as severe.    5. DURATION: \"How long does it last\" (e.g., seconds, minutes, hours)      Never resolves but decreases in severity.     6. SEVERITY: \"How bad is the pain?\"  (e.g., Scale 1-10; mild, moderate, or severe)     " " At worst it is a 6-7/10; 3-4/10    7. CARDIAC RISK FACTORS: \"Do you have any history of heart problems or risk factors for heart disease?\" (e.g., angina, prior heart attack; diabetes, high blood pressure, high cholesterol, smoker, or strong family history of heart disease)      Syncope and collapse, mom has high blood pressure and heart disease,  Smoke daily.  Smokes a pack and a half a day, menthol cigarettes    8. PULMONARY RISK FACTORS: \"Do you have any history of lung disease?\"  (e.g., blood clots in lung, asthma, emphysema, birth control pills)      States asthma in the past    9. CAUSE: \"What do you think is causing the chest pain?\"      Unsure    10. OTHER SYMPTOMS: \"Do you have any other symptoms?\" (e.g., dizziness, nausea, vomiting, sweating, fever, difficulty breathing, cough)        SOB, and feels \"body slows down.\"  Nausea and dizzy last night.    Protocols used: Chest Pain-Adult-[place]    "

## 2025-02-04 NOTE — TELEPHONE ENCOUNTER
Spouse called to request office reach out to patient to reschedule apt missed on 1/9. I offered to provide phone number and spouse declined and requested office reach out.    Please advise  Thank you

## 2025-02-04 NOTE — TELEPHONE ENCOUNTER
Regarding: Chest Pain / Heart rate  ----- Message from Kori WHITE sent at 2/4/2025 10:56 AM EST -----  Reba called to make an apt for patient. Stated patient has numbness in his toes and abdominal pain that has been on and off for 2 months. While scheduling patient an apt Reba stated patient has been having chest pains and feels like his heart rate is low. Reba stated patient has a history of fainting.I asked if patient was available to speak with a nurse. Reba requested nurse call patient on his phone.     Phone # updated : [phone]

## 2025-07-03 ENCOUNTER — TELEPHONE (OUTPATIENT)
Dept: FAMILY MEDICINE CLINIC | Facility: CLINIC | Age: 35
End: 2025-07-03

## 2025-07-03 NOTE — TELEPHONE ENCOUNTER
I called patient, no show for appt today. Patient will call back on Monday to reschedule appt with Najma.

## 2025-08-13 ENCOUNTER — TELEPHONE (OUTPATIENT)
Dept: FAMILY MEDICINE CLINIC | Facility: CLINIC | Age: 35
End: 2025-08-13

## (undated) DEVICE — GLOVE SRG BIOGEL 7

## (undated) DEVICE — LASER FIBER HOLMIUM 272MICRON

## (undated) DEVICE — LUBRICANT SURGILUBE TUBE 4 OZ  FLIP TOP

## (undated) DEVICE — SCD SEQUENTIAL COMPRESSION COMFORT SLEEVE MEDIUM KNEE LENGTH: Brand: KENDALL SCD

## (undated) DEVICE — UROCATCH BAG

## (undated) DEVICE — CATH URETERAL 5FR X 70 CM FLEX TIP POLYUR BARD

## (undated) DEVICE — TUBING SUCTION 5MM X 12 FT

## (undated) DEVICE — 3M™ TEGADERM™ TRANSPARENT FILM DRESSING FRAME STYLE, 1624W, 2-3/8 IN X 2-3/4 IN (6 CM X 7 CM), 100/CT 4CT/CASE: Brand: 3M™ TEGADERM™

## (undated) DEVICE — GUIDEWIRE STRGHT TIP 0.035 IN  SOLO PLUS

## (undated) DEVICE — BASKET SPECIMEN RETRIVAL 1.9FR 120CM

## (undated) DEVICE — SPECIMEN CONTAINER STERILE PEEL PACK

## (undated) DEVICE — PACK TUR